# Patient Record
Sex: MALE | Race: WHITE | Employment: OTHER | URBAN - METROPOLITAN AREA
[De-identification: names, ages, dates, MRNs, and addresses within clinical notes are randomized per-mention and may not be internally consistent; named-entity substitution may affect disease eponyms.]

---

## 2017-04-18 ENCOUNTER — OFFICE VISIT (OUTPATIENT)
Dept: ORTHOPEDICS | Facility: CLINIC | Age: 47
End: 2017-04-18
Payer: COMMERCIAL

## 2017-04-18 VITALS
SYSTOLIC BLOOD PRESSURE: 130 MMHG | HEART RATE: 70 BPM | HEIGHT: 74 IN | WEIGHT: 272 LBS | BODY MASS INDEX: 34.91 KG/M2 | DIASTOLIC BLOOD PRESSURE: 87 MMHG

## 2017-04-18 DIAGNOSIS — M54.16 LUMBAR RADICULITIS: Primary | ICD-10-CM

## 2017-04-18 DIAGNOSIS — M54.50 CHRONIC LEFT-SIDED LOW BACK PAIN WITHOUT SCIATICA: ICD-10-CM

## 2017-04-18 DIAGNOSIS — G89.29 CHRONIC LEFT-SIDED LOW BACK PAIN WITHOUT SCIATICA: ICD-10-CM

## 2017-04-18 PROCEDURE — 99204 OFFICE O/P NEW MOD 45 MIN: CPT | Performed by: PHYSICIAN ASSISTANT

## 2017-04-18 NOTE — MR AVS SNAPSHOT
After Visit Summary   4/18/2017    Sergio Cadena    MRN: [id number]           Patient Information     Date Of Birth          1970        Visit Information        Provider Department      4/18/2017 3:00 PM Lucia Cisneros PA-C [facility]        Today's Diagnoses     Lumbar radiculitis    -  1    Chronic left-sided low back pain without sciatica          Care Instructions    To schedule your MRI and Injection with [facility], please call [phone].  Please allow 2-3 business days for clinic to obtain results.  We will contact you, review your results with you and update your treatment plan at that time.      If this fails to improve symptoms, I am recommending an evaluation with physical therapy for manual therapy treatments.          Follow-ups after your visit        Future tests that were ordered for you today     Open Future Orders        Priority Expected Expires Ordered    XR Lumbar Epidural Injection Incl Imaging Routine 4/18/2017 4/18/2018 4/18/2017    MR Lumbar Spine w/o Contrast Routine  4/18/2018 4/18/2017            Who to contact     If you have questions or need follow up information about today's clinic visit or your schedule please contact [facility] directly at [phone].  Normal or non-critical lab and imaging results will be communicated to you by MyChart, letter or phone within 4 business days after the clinic has received the results. If you do not hear from us within 7 days, please contact the clinic through [employer] or phone. If you have a critical or abnormal lab result, we will notify you by phone as soon as possible.  Submit refill requests through [facility] or call your pharmacy and they will forward the refill request to us. Please allow 3 business days for your refill to be completed.          Additional Information About Your Visit        MyChart Information     [facility] lets  "you send messages to your doctor, view your test results, renew your prescriptions, schedule appointments and more. To sign up, go to [email or web address] . Click on \"Log in\" on the left side of the screen, which will take you to the Welcome page. Then click on \"Sign up Now\" on the right side of the page.     You will be asked to enter the access code listed below, as well as some personal information. Please follow the directions to create your username and password.     Your access code is: [email or web address]  Expires: 2017  3:32 PM     Your access code will  in 90 days. If you need help or a new code, please call your [facility] clinic or [phone].        Care EveryWhere ID     This is your Care EveryWhere ID. This could be used by other organizations to access your [facility] medical records  [id number]        Your Vitals Were     Pulse Height BMI (Body Mass Index)             70 6' 1.5\" (1.867 m) 35.4 kg/m2          Blood Pressure from Last 3 Encounters:   17 130/87   16 (!) 154/96   09/01/15 128/90    Weight from Last 3 Encounters:   17 272 lb (123.4 kg)   16 272 lb 1.6 oz (123.4 kg)   09/01/15 260 lb 6.4 oz (118.1 kg)               Primary Care Provider Office Phone # Fax #    Leda Shanks [phone]       [facility] [address] [facility] [address]        Thank you!     Thank you for choosing [facility]  for your care. Our goal is always to provide you with excellent care. Hearing back from our patients is one way we can continue to improve our services. Please take a few minutes to complete the written survey that you may receive in the mail after your visit with us. Thank you!             Your Updated Medication List - Protect others around you: Learn how to safely use, store and throw away your medicines at www.disposemymeds.org.          This list is accurate as of: 17  3:32 PM.  Always use " your most recent med list.                   Brand Name Dispense Instructions for use    albuterol 108 (90 BASE) MCG/ACT Inhaler    PROAIR HFA/PROVENTIL HFA/VENTOLIN HFA    1 Inhaler    Inhale 2 puffs into the lungs every 6 hours as needed for shortness of breath / dyspnea or wheezing       guaiFENesin-codeine 100-10 MG/5ML Soln solution    ROBITUSSIN AC    120 mL    Take 10 mLs by mouth every 4 hours as needed for cough       hydrochlorothiazide 12.5 MG Tabs tablet     90 tablet    Take 1 tablet (12.5 mg) by mouth daily       lisinopril 40 MG tablet    PRINIVIL/ZESTRIL    90 tablet    Take 1 tablet (40 mg) by mouth daily       priLOSEC OTC 20 MG tablet   Generic drug:  omeprazole      1 TABLET DAILY

## 2017-04-18 NOTE — NURSING NOTE
"Initial /87  Pulse 70  Ht 6' 1.5\" (1.867 m)  Wt 272 lb (123.4 kg)  BMI 35.4 kg/m2 Estimated body mass index is 35.4 kg/(m^2) as calculated from the following:    Height as of this encounter: 6' 1.5\" (1.867 m).    Weight as of this encounter: 272 lb (123.4 kg). .      "

## 2017-04-18 NOTE — PATIENT INSTRUCTIONS
To schedule your MRI and Injection with [facility], please call [phone].  Please allow 2-3 business days for clinic to obtain results.  We will contact you, review your results with you and update your treatment plan at that time.      If this fails to improve symptoms, I am recommending an evaluation with physical therapy for manual therapy treatments.

## 2017-04-18 NOTE — LETTER
[facility]  [address]  [address]  [place] [address]  Phone: [phone]  Fax: [phone]    April 18, 2017        Sergio Cadena  [address] [address] [address]          To whom it may concern:    RE: Sergio Cadena    Patient was seen and treated today at our clinic.  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    Please contact me for questions or concerns.      Sincerely,        Lucia Cisneros PA-C

## 2017-04-18 NOTE — PROGRESS NOTES
"Sports Medicine Clinic Visit    PCP: Leda Shanks    Sergio Cadena is a 47 year old male who is seen  as a self referral presenting with hip pain    Injury: None    Location of Pain: left lateral hip pain, buttock pain and low back pain.  Duration of Pain: 4 years  Rating of Pain at worst: 4/10  Rating of Pain Currently: 3/10  Symptoms are better with: IBU  Symptoms are worse with: Any movement, getting out of bed, walking for long periods, extending back  Additional Features:   Positive: None  Other evaluation and/or treatments so far consists of: massage once a week (some help) taking wallet out of that pockent  Prior History of related problems: None    Social History: Works in law enforcement.    Review of Systems  Musculoskeletal: as above  Remainder of review of systems is negative including constitutional, CV, pulmonary, GI, Skin and Neurologic except as noted in HPI or medical history.    Family history, medical history and surgical history have all been discussed with patient and appended to medical chart below.    Past Medical History:   Diagnosis Date     Diaphragmatic hernia without mention of obstruction or gangrene     EDG 9/05     Esophageal reflux     EGD 9/05     Stricture and stenosis of esophagus     Dilated 9/05     Past Surgical History:   Procedure Laterality Date     Fracture left elbow repaired by casting  1980     Gastroscopy  &  Esophageal dilatation  7/9/2003     History reviewed. No pertinent family history.  Objective  /87  Pulse 70  Ht 6' 1.5\" (1.867 m)  Wt 272 lb (123.4 kg)  BMI 35.4 kg/m2  Constitutional:well-developed, well-nourished, and in no distress.   Cardiovascular: Intact distal pulses.    Neurological: alert. Gait normal  Skin: Skin is warm and dry.   Psychiatric: Mood and affect normal.   Respiratory: unlabored, speaks in full sentences  Hematologic/Lymphatic/Immunologic: neg lymphadenopathy, neg lymphedema    Exam:  Back Exam:    Inspection:       " no visible deformity in the low back       normal skin       normal vascular       normal lymphatic    ROM:       full flexion       no asymmetric rotation of the pelvis with flexion       limited flexion due to pain    Full ROM of hip without discomfort    Tender/ Tissue Texture Abnormality:       paraspinal muscles - lumbar       TFL       SI joint           Non Tender:       remainder of lumbar spine    Strength:       hip flexion 5/5       knee extension 5/5       ankle dorsiflexion 5/5       ankle plantarflexion 5/5       dorsiflexion of the great toe 5/5      Hip abduction: 4/5      Hip adduction:  5/5    Reflexes:       patellar (L3, L4) symmetric normal       achilles tendons (S1) symmetric normal    Sensation:      grossly intact throughout lower extremities    Special tests:       straight leg raise left neg        straight leg raise right neg                 Assessment:  1. Lumbar radiculitis    2. Chronic left-sided low back pain without sciatica        Plan:  Discussed the assessment with the patient.  Topical Treatments: Ice, Heat or Topical Analgesics  Over the counter medication: Acetaminophen (Tylenol) 1000mg every 6 hours with food (Maximum of 3000mg/day)  Naproxen (Aleve) maximum of 440mg two times a day with food  MRI of the lumbar spine  Observe and monitor symptoms  Activity Modification: discussed appropriate body mechanics and posture  Rehab: discussed the benefits of physical therapy however patient would like to wait until he has had his MRI, he may consider chiropractic as well  Epidural Steroid injection done at [facility]  Follow up: 6 weeks, sooner if needed          Lucia Cisneros PA-C  [facility]  [facility]      Disclaimer: This note consists of symbols derived from keyboarding, dictation and/or voice recognition software. As a result, there may be errors in the script that have gone undetected. Please consider this when  interpreting information found in this chart.

## 2017-04-19 ENCOUNTER — TELEPHONE (OUTPATIENT)
Dept: ORTHOPEDICS | Facility: CLINIC | Age: 47
End: 2017-04-19

## 2017-04-19 NOTE — TELEPHONE ENCOUNTER
Patient called back.  He just needs letter amended to say he has no restrictions on daily work, just should refrain from Use of Force training.    Letter completed and faxed to [phone]    SIDDHARTH Vu,  ATC

## 2017-04-19 NOTE — TELEPHONE ENCOUNTER
Reason for Call:  Other letter for work     Detailed comments: pt calling stating he is needing a new letter for work. He needs it to state that he can continue to work w/ out restrictions. Would like someone to call him to discuss how this can be written     Phone Number Patient can be reached at: Home number on file [phone] (home)    Best Time: any     Can we leave a detailed message on this number? YES    Call taken on 4/19/2017 at 10:59 AM by Jia Kelly

## 2017-04-19 NOTE — LETTER
[facility]  [address]  [address]  [place] [address]  Phone: [phone]  Fax: [phone]    April 18, 2017        Sergio Cadena  [address] [address] [address]          To whom it may concern:    RE: Sergio Cadena    Patient was seen and treated today at our clinic.  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  However, patient may work in his normal job duties without restrictions at this time.     Please contact me for questions or concerns.      Sincerely,        Lucia Cisneros PA-C

## 2017-04-19 NOTE — TELEPHONE ENCOUNTER
Patient is calling in to check on the status    Please advise if unable to address and if so when this would be addressed    He can [phone]

## 2017-04-21 DIAGNOSIS — I10 HYPERTENSION GOAL BP (BLOOD PRESSURE) < 140/90: ICD-10-CM

## 2017-04-21 RX ORDER — HYDROCHLOROTHIAZIDE 12.5 MG/1
TABLET ORAL
Qty: 60 TABLET | Refills: 0 | Status: SHIPPED | OUTPATIENT
Start: 2017-04-21 | End: 2017-08-24 | Stop reason: DRUGHIGH

## 2017-04-21 RX ORDER — LISINOPRIL 40 MG/1
TABLET ORAL
Qty: 60 TABLET | Refills: 0 | Status: SHIPPED | OUTPATIENT
Start: 2017-04-21 | End: 2017-07-11

## 2017-04-21 NOTE — TELEPHONE ENCOUNTER
Routing refill request to provider for review/approval because:  Labs not current:  K+ and CR  Patient needs to be seen because it has been more than 1 year since last office visit.      Thank you  Daphne LARSEN RN

## 2017-04-21 NOTE — TELEPHONE ENCOUNTER
Last seen one year ago.  I will fill two months of meds. He should come fasting for his med refill appointment   Please let him know   cgb

## 2017-04-21 NOTE — TELEPHONE ENCOUNTER
Called pt and notified him that he needs to come fasting to an appt for med refill.    Lilia Cortés

## 2017-04-21 NOTE — TELEPHONE ENCOUNTER
hydrochlorothiazide 12.5 MG TABS      Last Written Prescription Date: 4/12/16  Last Fill Quantity: 90, # refills: 3  Last Office Visit with [facility] prescribing provider: 4/12/16       Potassium   Date Value Ref Range Status   03/17/2016 3.5 3.4 - 5.3 mmol/L Final     Creatinine   Date Value Ref Range Status   03/17/2016 0.86 0.66 - 1.25 mg/dL Final     BP Readings from Last 3 Encounters:   04/18/17 130/87   04/12/16 (!) 154/96   09/01/15 128/90         lisinopril (PRINIVIL,ZESTRIL) 40 MG tablet      Last Written Prescription Date: 4/12/16  Last Fill Quantity: 90, # refills: 3  Last Office Visit with [facility], [facility] or [facility] prescribing provider: 4/12/16       Potassium   Date Value Ref Range Status   03/17/2016 3.5 3.4 - 5.3 mmol/L Final     Creatinine   Date Value Ref Range Status   03/17/2016 0.86 0.66 - 1.25 mg/dL Final     BP Readings from Last 3 Encounters:   04/18/17 130/87   04/12/16 (!) 154/96   09/01/15 128/90

## 2017-05-02 ENCOUNTER — TELEPHONE (OUTPATIENT)
Dept: ORTHOPEDICS | Facility: CLINIC | Age: 47
End: 2017-05-02

## 2017-05-02 ENCOUNTER — RADIANT APPOINTMENT (OUTPATIENT)
Dept: GENERAL RADIOLOGY | Facility: CLINIC | Age: 47
End: 2017-05-02
Attending: PHYSICIAN ASSISTANT
Payer: COMMERCIAL

## 2017-05-02 ENCOUNTER — RADIANT APPOINTMENT (OUTPATIENT)
Dept: MRI IMAGING | Facility: CLINIC | Age: 47
End: 2017-05-02
Attending: PHYSICIAN ASSISTANT
Payer: COMMERCIAL

## 2017-05-02 VITALS — DIASTOLIC BLOOD PRESSURE: 92 MMHG | HEART RATE: 74 BPM | SYSTOLIC BLOOD PRESSURE: 165 MMHG | OXYGEN SATURATION: 95 %

## 2017-05-02 DIAGNOSIS — M54.50 CHRONIC LEFT-SIDED LOW BACK PAIN WITHOUT SCIATICA: ICD-10-CM

## 2017-05-02 DIAGNOSIS — G89.29 CHRONIC LEFT-SIDED LOW BACK PAIN WITHOUT SCIATICA: ICD-10-CM

## 2017-05-02 DIAGNOSIS — M54.16 LUMBAR RADICULITIS: ICD-10-CM

## 2017-05-02 LAB — RADIOLOGIST FLAGS: NORMAL

## 2017-05-02 PROCEDURE — 62323 NJX INTERLAMINAR LMBR/SAC: CPT | Performed by: RADIOLOGY

## 2017-05-02 PROCEDURE — 72148 MRI LUMBAR SPINE W/O DYE: CPT | Performed by: RADIOLOGY

## 2017-05-02 RX ORDER — LIDOCAINE HYDROCHLORIDE 10 MG/ML
5 INJECTION, SOLUTION EPIDURAL; INFILTRATION; INTRACAUDAL; PERINEURAL ONCE
Status: COMPLETED | OUTPATIENT
Start: 2017-05-02 | End: 2017-05-02

## 2017-05-02 RX ORDER — BUPIVACAINE HYDROCHLORIDE 5 MG/ML
10 INJECTION, SOLUTION EPIDURAL; INTRACAUDAL ONCE
Status: COMPLETED | OUTPATIENT
Start: 2017-05-02 | End: 2017-05-02

## 2017-05-02 RX ORDER — METHYLPREDNISOLONE ACETATE 80 MG/ML
80 INJECTION, SUSPENSION INTRA-ARTICULAR; INTRALESIONAL; INTRAMUSCULAR; SOFT TISSUE ONCE
Status: COMPLETED | OUTPATIENT
Start: 2017-05-02 | End: 2017-05-02

## 2017-05-02 RX ORDER — IOPAMIDOL 408 MG/ML
10 INJECTION, SOLUTION INTRATHECAL ONCE
Status: COMPLETED | OUTPATIENT
Start: 2017-05-02 | End: 2017-05-02

## 2017-05-02 RX ADMIN — IOPAMIDOL 2 ML: 408 INJECTION, SOLUTION INTRATHECAL at 07:55

## 2017-05-02 RX ADMIN — LIDOCAINE HYDROCHLORIDE 50 MG: 10 INJECTION, SOLUTION EPIDURAL; INFILTRATION; INTRACAUDAL; PERINEURAL at 07:54

## 2017-05-02 RX ADMIN — BUPIVACAINE HYDROCHLORIDE 2 ML: 5 INJECTION, SOLUTION EPIDURAL; INTRACAUDAL at 07:55

## 2017-05-02 RX ADMIN — METHYLPREDNISOLONE ACETATE 80 MG: 80 INJECTION, SUSPENSION INTRA-ARTICULAR; INTRALESIONAL; INTRAMUSCULAR; SOFT TISSUE at 07:54

## 2017-05-02 NOTE — TELEPHONE ENCOUNTER
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 If he can;t return without any restrictions he would have to continue on light duty.    Please clarify. If restriction stated will not be omitted please call to inform that provider recommends refraining from use of force training [phone]

## 2017-05-02 NOTE — LETTER
[facility]  [address]  [address]  [place] [address]  Phone: [phone]  Fax: [phone]    05/02/17        Sergio Cadena  [address] [address] [address]          To whom it may concern:    RE: Sergio Cadena    Patient was seen for an injection and MRI today.  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  However, patient may work in his normal job duties without restrictions at this time.     Please contact me for questions or concerns.      Sincerely,          Lucia Cisneros PA-C

## 2017-05-02 NOTE — PROGRESS NOTES
: Sergio was seen in X-ray today for a lumbar epidural injection. Patient rated pain before procedure 1-2/10. After procedure patient rated pain 0/10. This pain level is (is/is not) acceptable to patient. Patient discharged home with wife.

## 2017-05-02 NOTE — TELEPHONE ENCOUNTER
Reason for Call:  Patient is calling in states that he had MRI and injection in [place] and is requesting a return to work note without restrictions asap        Detailed comments: please fax to: Clarisse Hoffmann at [phone]    Phone Number Patient can be reached at: Home number on file [phone] (home)    Best Time: any    Can we leave a detailed message on this number? YES    Call taken on 5/2/2017 at 9:38 AM by Christi Max

## 2017-05-02 NOTE — TELEPHONE ENCOUNTER
Letter provided and faxed to Clarisse as requested.  Lucia please advise on MRI results when available.    Noman Edwards ATC

## 2017-05-02 NOTE — LETTER
[facility]  [address]  [address]  [place] [address]  Phone: [phone]  Fax: [phone]    05/04/17    Sergio ARMSTRONG Tammi  [address] [address] [address]      To whom it may concern:     Sergio may return to work 5/4/17 without restrictions.  He is to follow up in clinic as needed.  Please contact the clinic with any other questions or concerns.      Sincerely,      Lucia Cisneros PA-C

## 2017-05-03 NOTE — TELEPHONE ENCOUNTER
Pt calling to check status of new letter. Please see below.      Please call    Jia Kelly  Specialty CSS

## 2017-05-03 NOTE — TELEPHONE ENCOUNTER
Patient is calling inquiring the status of this request    Any questions please call [phone]    Clarisse BAUMAN is out of office today so please call patient when this has been faxed

## 2017-05-04 ENCOUNTER — TELEPHONE (OUTPATIENT)
Dept: ORTHOPEDICS | Facility: CLINIC | Age: 47
End: 2017-05-04

## 2017-05-04 DIAGNOSIS — M51.9 LUMBAR DISC LESION: Primary | ICD-10-CM

## 2017-05-04 NOTE — TELEPHONE ENCOUNTER
Spoke with Pt.  New letter lifting restrictions faxed to Clarisse per Pt request.    Brinda MENESES,  CMA

## 2017-05-04 NOTE — TELEPHONE ENCOUNTER
Please inform patient that the letter was sent to HR to Clarisse Hoffmann however she has reported that she will not proceed with the letter as he does have the restriction of not being able to participate in Use of Force Training.  She states that he will have to go back to light duty restrictions.  If he is willing to participate in Use of Force Training, then they will allow him to return without restrictions.    Lucia Cisneros PA-C

## 2017-05-04 NOTE — TELEPHONE ENCOUNTER
I left a voicemail with the patient to have him contact clinic to review results.    Lucia Cisneros PA-C

## 2017-05-06 DIAGNOSIS — M51.9 LUMBAR DISC LESION: Primary | ICD-10-CM

## 2017-05-15 ENCOUNTER — RADIANT APPOINTMENT (OUTPATIENT)
Dept: MRI IMAGING | Facility: CLINIC | Age: 47
End: 2017-05-15
Attending: PHYSICIAN ASSISTANT
Payer: COMMERCIAL

## 2017-05-15 DIAGNOSIS — M51.9 LUMBAR DISC LESION: ICD-10-CM

## 2017-05-15 PROCEDURE — 72149 MRI LUMBAR SPINE W/DYE: CPT | Performed by: RADIOLOGY

## 2017-05-15 PROCEDURE — A9585 GADOBUTROL INJECTION: HCPCS | Performed by: RADIOLOGY

## 2017-05-15 RX ORDER — GADOBUTROL 604.72 MG/ML
10 INJECTION INTRAVENOUS ONCE
Status: COMPLETED | OUTPATIENT
Start: 2017-05-15 | End: 2017-05-15

## 2017-05-15 RX ADMIN — GADOBUTROL 10 ML: 604.72 INJECTION INTRAVENOUS at 16:45

## 2017-05-16 ENCOUNTER — TELEPHONE (OUTPATIENT)
Dept: ORTHOPEDICS | Facility: CLINIC | Age: 47
End: 2017-05-16

## 2017-05-16 DIAGNOSIS — D32.1 SPINAL MENINGIOMA (H): Primary | ICD-10-CM

## 2017-05-16 PROBLEM — D49.2 NERVE SHEATH TUMOR: Status: ACTIVE | Noted: 2017-05-16

## 2017-05-16 NOTE — TELEPHONE ENCOUNTER
Called patient and gave him his results.  I am recommending he have an orthopedic oncology consultation for further discussion regarding the findings and for future follow up.      Lucia Cisneros PA-C

## 2017-05-17 ENCOUNTER — TELEPHONE (OUTPATIENT)
Dept: ORTHOPEDICS | Facility: CLINIC | Age: 47
End: 2017-05-17

## 2017-05-17 NOTE — TELEPHONE ENCOUNTER
"Pt calls ortho triage line seeking appt with Orthopedics surgeon, Oncology vs Spine Surgeon, d/t possible meningioma on spine. Per MRI on 05/15 impression states: \"5mm intradural/extra medullary enhancing mass at L2-L3, likely nerve sheath tumor or meningioma\".  Writer asked Dr Romano to review images and per conversation with Dr Romano the appropriate team for this pt to see is Neurosurgery. Pt was given telephone number to neurosurgery and will call to set up an appointment.  "

## 2017-06-29 ENCOUNTER — OFFICE VISIT (OUTPATIENT)
Dept: NEUROSURGERY | Facility: CLINIC | Age: 47
End: 2017-06-29

## 2017-06-29 VITALS
DIASTOLIC BLOOD PRESSURE: 98 MMHG | WEIGHT: 259 LBS | HEART RATE: 83 BPM | HEIGHT: 73 IN | SYSTOLIC BLOOD PRESSURE: 167 MMHG | BODY MASS INDEX: 34.33 KG/M2 | RESPIRATION RATE: 20 BRPM | TEMPERATURE: 97.6 F | OXYGEN SATURATION: 96 %

## 2017-06-29 DIAGNOSIS — D32.1 SPINAL MENINGIOMA (H): Primary | ICD-10-CM

## 2017-06-29 RX ORDER — CHLORAL HYDRATE 500 MG
2 CAPSULE ORAL DAILY
COMMUNITY
End: 2017-12-11

## 2017-06-29 ASSESSMENT — ENCOUNTER SYMPTOMS
COUGH: 1
STIFFNESS: 1

## 2017-06-29 ASSESSMENT — PAIN SCALES - GENERAL: PAINLEVEL: MILD PAIN (2)

## 2017-06-29 NOTE — MR AVS SNAPSHOT
After Visit Summary   6/29/2017    Sergio Cadena    MRN: [id number]           Patient Information     Date Of Birth          1970        Visit Information        Provider Department      6/29/2017 9:30 AM Tash Cartagena MD [facility] Neurosurgery        Today's Diagnoses     Spinal meningioma (H)    -  1       Follow-ups after your visit        Follow-up notes from your care team     Return for Follow-up after testing.      Your next 10 appointments already scheduled     Jul 11, 2017  9:00 AM CDT   [facility]    [address] [address]   [phone]            Aug 01, 2017 10:15 AM CDT   (Arrive by 10:00 AM)   EMG with Enrique Clinton MD   [facility] EMG ([facility])    [address] [place] [address] [phone]           Do not use lotions or creams on the area to be tested. If you are on blood thinners (Warfarin, Coumadin, Lovenox, etc), please contact your primary care physician to check if it is safe to stop them 3 days prior to testing. If you have anxiety, please check with your referring physician to obtain anti-anxiety medication for the procedure.            Aug 24, 2017  3:15 PM CDT   (Arrive by 3:00 PM)   Return Visit with Tash Cartagena MD   [facility] Neurosurgery ([facility])    [address] [place] [address] [phone]              Future tests that were ordered for you today     Open Future Orders        Priority Expected Expires Ordered    EMG (specify location and side) [NEU5] Routine 7/27/2017 6/29/2018 6/29/2017            Who to contact     Please call your clinic at [phone] to:    Ask questions about your health    Make or cancel appointments    Discuss your medicines    Learn about your test results    Speak to your doctor   If you have  "compliments or concerns about an experience at your clinic, or if you wish to file a complaint, please contact [facility] Patient Relations at [phone] or email us at [email or web address]         Additional Information About Your Visit        [employer] Information     [employer] is an electronic gateway that provides easy, online access to your medical records. With [employer], you can request a clinic appointment, read your test results, renew a prescription or communicate with your care team.     To sign up for [employer] visit the website at [email or web address]   You will be asked to enter the access code listed below, as well as some personal information. Please follow the directions to create your username and password.     Your access code is: [email or web address]  Expires: 2017  3:32 PM     Your access code will  in 90 days. If you need help or a new code, please contact your [facility] or call [phone] for assistance.        Care EveryWhere ID     This is your Care EveryWhere ID. This could be used by other organizations to access your [facility] medical records  [id number]        Your Vitals Were     Pulse Temperature Respirations Height Pulse Oximetry BMI (Body Mass Index)    83 97.6  F (36.4  C) (Oral) 20 1.842 m (6' 0.5\") 96% 34.64 kg/m2       Blood Pressure from Last 3 Encounters:   17 (!) 167/98   17 (!) 165/92   17 130/87    Weight from Last 3 Encounters:   17 117.5 kg (259 lb)   17 123.4 kg (272 lb)   16 123.4 kg (272 lb 1.6 oz)               Primary Care Provider Office Phone # Fax #    Leda Shanks [phone]       [facility] [address] [address] [address]        Equal Access to Services     HOWARD BROWNING AH: Josie Arceo, nury mercer, qadonnell mcaan ah. So [facility] " [phone].    ATENCIÓN: Si francesca flowers, tiene a vitale disposición servicios gratuitos de asistencia lingüística. Aniya al [phone].    We comply with applicable federal civil rights laws and [place] laws. We do not discriminate on the basis of race, color, national origin, age, disability sex, sexual orientation or gender identity.            Thank you!     Thank you for choosing [facility]  for your care. Our goal is always to provide you with excellent care. Hearing back from our patients is one way we can continue to improve our services. Please take a few minutes to complete the written survey that you may receive in the mail after your visit with us. Thank you!             Your Updated Medication List - Protect others around you: Learn how to safely use, store and throw away your medicines at www.disposemymeds.org.          This list is accurate as of: 6/29/17 11:17 AM.  Always use your most recent med list.                   Brand Name Dispense Instructions for use Diagnosis    fish oil-omega-3 fatty acids 1000 MG capsule      Take 2 g by mouth daily        hydrochlorothiazide 12.5 MG Tabs tablet     60 tablet    TAKE 1 TABLET BY MOUTH MARIAN Y    Hypertension goal BP (blood pressure) < 140/90       lisinopril 40 MG tablet    PRINIVIL/ZESTRIL    60 tablet    TAKE 1 TABLET BY MOUTH MARIAN Y    Hypertension goal BP (blood pressure) < 140/90       MULTIVITAMIN ADULTS PO      Take 1 tablet by mouth daily        priLOSEC OTC 20 MG tablet   Generic drug:  omeprazole      1 TABLET DAILY

## 2017-06-29 NOTE — LETTER
"6/29/2017       RE: Sergio Cadena  [address] [facility] [address]     Dear Colleague,    Thank you for referring your patient, Sergio Cadena, to the [facility] NEUROSURGERY at [facility]. Please see a copy of my visit note below.    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 MR was obtained for patient's back pain which has been ongoing for the past 3 years. He received a cortisone shot 05/2017 at the L2 level with slight improvement. His back pain begins in his low back and continues down to his LEFT lateral mid-thigh. He also takes ibuprofen which helps somewhat. Mr. Cadena reports his pain limits his ability to walk and work, as he is a . He does report a limp with walking and weakness with his left hip. He denies any bowel or bladder incontinence, numbness, impotence, or muscle weakness elsewhere. He denies any systemic issues with unintentional weight loss or fever or night sweats.     FamHx: father with hx of CABG    Physical exam:  BP (!) 167/98 (BP Location: Right arm, Patient Position: Sitting, Cuff Size: Adult Large)  Pulse 83  Temp 97.6  F (36.4  C) (Oral)  Resp 20  Ht 1.842 m (6' 0.5\")  Wt 117.5 kg (259 lb)  SpO2 96%  BMI 34.64 kg/m2    General: appears comfortable, in no acute distress   NEURO:  Mental: Alert and oriented x 3.   CN: CN II-XII grossly intact. Symmetric eyelid elevation and smile. No aphasia or dysarthria.   Motor:     Triceps Biceps Wrist Ext Wrist Flex    R  5 5 5 5 5   L  5 5 5 5 5    Hip Flex. Knee Ext. Knee Flex. Dorsiflex. Plantarflex.    R 4 5 5 5 5    L 4 5 5 5 5      Sensory: Intact to light touch throughout all dermatomes of bilateral lower extremities   Reflexes: Patellar and achilles reflex 2+ bilaterally     Imaging:   MR lumbar spine 5/15/17: 5 mm enhancing " mass at L2-L3 with mass below the level of the L2 nerve root     Assessment/Plan:   Mr. 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 The mass is likely benign and the differential includes myxopapillary ependymoma, spinal schwannoma or meningioma. The patient's signs and symptoms of pain from his buttock continuing to his lateral mid-thigh and hip flexion weakness cannot be clearly attributed to the lesion in his spine as the L2 nerve root leaves before the level of the mass but however, this does not exclude the possibility that the mass impacts L3. We discussed with the patient two potential strategies of watchful waiting and surgical resection including the risks and benefits of each strategy. He is currently amenable to further diagnostic workup and a watchful waiting strategy given the less likely but still possible correlation of his signs and symptoms with his spinal lesion.     Plan:   --nerve conduction study referral   --follow-up if worsening weakness, numbness    Pawan Brown MD   PGY-1, Neurosurgery      Again, thank you for allowing me to participate in the care of your patient.      Sincerely,    Tash Cartagena MD

## 2017-06-29 NOTE — NURSING NOTE
Chief Complaint   Patient presents with     Consult     [facility]- LOWER BACK PAIN/ ABNORMAL LUMBAR SPINE MRI     Tin Jessica, CMA

## 2017-06-29 NOTE — PROGRESS NOTES
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 MR was obtained for patient's back pain which has been ongoing for the past 3 years. He received a cortisone shot 05/2017 at the L2 level with slight improvement. His back pain begins in his low back and continues down to his LEFT lateral mid-thigh. He also takes ibuprofen which helps somewhat. Mr. Cadena reports his pain limits his ability to walk and work, as he is a . He does report a limp with walking and weakness with his left hip. He denies any bowel or bladder incontinence, numbness, impotence, or muscle weakness elsewhere. He denies any systemic issues with unintentional weight loss or fever or night sweats.     FamHx: father with hx of CABG    Physical exam:  BP (!) 167/98 (BP Location: Right arm, Patient Position: Sitting, Cuff Size: Adult Large)  Pulse 83  Temp 97.6  F (36.4  C) (Oral)  Resp 20  Ht 1.842 m (6' 0.5\")  Wt 117.5 kg (259 lb)  SpO2 96%  BMI 34.64 kg/m2    General: appears comfortable, in no acute distress   NEURO:  Mental: Alert and oriented x 3.   CN: CN II-XII grossly intact. Symmetric eyelid elevation and smile. No aphasia or dysarthria.   Motor:     Triceps Biceps Wrist Ext Wrist Flex    R  5 5 5 5 5   L  5 5 5 5 5    Hip Flex. Knee Ext. Knee Flex. Dorsiflex. Plantarflex.    R 4 5 5 5 5    L 4 5 5 5 5      Sensory: Intact to light touch throughout all dermatomes of bilateral lower extremities   Reflexes: Patellar and achilles reflex 2+ bilaterally     Imaging:   MR lumbar spine 5/15/17: 5 mm enhancing mass at L2-L3 with mass below the level of the L2 nerve root     Assessment/Plan:   Mr. 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 The mass is likely benign and the differential includes myxopapillary ependymoma, spinal schwannoma or meningioma. The patient's signs and " symptoms of pain from his buttock continuing to his lateral mid-thigh and hip flexion weakness cannot be clearly attributed to the lesion in his spine as the L2 nerve root leaves before the level of the mass but however, this does not exclude the possibility that the mass impacts L3. We discussed with the patient two potential strategies of watchful waiting and surgical resection including the risks and benefits of each strategy. He is currently amenable to further diagnostic workup and a watchful waiting strategy given the less likely but still possible correlation of his signs and symptoms with his spinal lesion.     Plan:   --nerve conduction study referral   --follow-up if worsening weakness, numbness    Pawan Brown MD   PGY-1, Neurosurgery

## 2017-07-11 ENCOUNTER — OFFICE VISIT (OUTPATIENT)
Dept: FAMILY MEDICINE | Facility: CLINIC | Age: 47
End: 2017-07-11
Payer: COMMERCIAL

## 2017-07-11 VITALS
HEART RATE: 60 BPM | WEIGHT: 257.4 LBS | SYSTOLIC BLOOD PRESSURE: 134 MMHG | BODY MASS INDEX: 34.11 KG/M2 | HEIGHT: 73 IN | DIASTOLIC BLOOD PRESSURE: 87 MMHG | TEMPERATURE: 97.5 F

## 2017-07-11 DIAGNOSIS — D49.2 NERVE SHEATH TUMOR: ICD-10-CM

## 2017-07-11 DIAGNOSIS — I10 ESSENTIAL HYPERTENSION: ICD-10-CM

## 2017-07-11 DIAGNOSIS — K21.9 GASTROESOPHAGEAL REFLUX DISEASE, ESOPHAGITIS PRESENCE NOT SPECIFIED: ICD-10-CM

## 2017-07-11 DIAGNOSIS — I10 HYPERTENSION GOAL BP (BLOOD PRESSURE) < 140/90: ICD-10-CM

## 2017-07-11 DIAGNOSIS — Z23 NEED FOR VACCINATION: ICD-10-CM

## 2017-07-11 DIAGNOSIS — E78.5 HYPERLIPIDEMIA LDL GOAL <130: ICD-10-CM

## 2017-07-11 DIAGNOSIS — Z00.01 ENCOUNTER FOR GENERAL ADULT MEDICAL EXAMINATION WITH ABNORMAL FINDINGS: Primary | ICD-10-CM

## 2017-07-11 DIAGNOSIS — R73.09 BLOOD GLUCOSE ABNORMAL: ICD-10-CM

## 2017-07-11 LAB
ANION GAP SERPL CALCULATED.3IONS-SCNC: 5 MMOL/L (ref 3–14)
BUN SERPL-MCNC: 11 MG/DL (ref 7–30)
CALCIUM SERPL-MCNC: 8.8 MG/DL (ref 8.5–10.1)
CHLORIDE SERPL-SCNC: 105 MMOL/L (ref 94–109)
CHOLEST SERPL-MCNC: 176 MG/DL
CO2 SERPL-SCNC: 28 MMOL/L (ref 20–32)
CREAT SERPL-MCNC: 0.86 MG/DL (ref 0.66–1.25)
GFR SERPL CREATININE-BSD FRML MDRD: NORMAL ML/MIN/1.7M2
GLUCOSE SERPL-MCNC: 99 MG/DL (ref 70–99)
HBA1C MFR BLD: 5.5 % (ref 4.3–6)
HDLC SERPL-MCNC: 34 MG/DL
LDLC SERPL CALC-MCNC: 118 MG/DL
NONHDLC SERPL-MCNC: 142 MG/DL
POTASSIUM SERPL-SCNC: 3.6 MMOL/L (ref 3.4–5.3)
SODIUM SERPL-SCNC: 138 MMOL/L (ref 133–144)
TRIGL SERPL-MCNC: 122 MG/DL

## 2017-07-11 PROCEDURE — 90471 IMMUNIZATION ADMIN: CPT | Performed by: FAMILY MEDICINE

## 2017-07-11 PROCEDURE — 90715 TDAP VACCINE 7 YRS/> IM: CPT | Performed by: FAMILY MEDICINE

## 2017-07-11 PROCEDURE — 36415 COLL VENOUS BLD VENIPUNCTURE: CPT | Performed by: FAMILY MEDICINE

## 2017-07-11 PROCEDURE — 99396 PREV VISIT EST AGE 40-64: CPT | Mod: 25 | Performed by: FAMILY MEDICINE

## 2017-07-11 PROCEDURE — 80048 BASIC METABOLIC PNL TOTAL CA: CPT | Performed by: FAMILY MEDICINE

## 2017-07-11 PROCEDURE — 83036 HEMOGLOBIN GLYCOSYLATED A1C: CPT | Performed by: FAMILY MEDICINE

## 2017-07-11 PROCEDURE — 80061 LIPID PANEL: CPT | Performed by: FAMILY MEDICINE

## 2017-07-11 RX ORDER — OMEPRAZOLE 20 MG/1
20 TABLET, DELAYED RELEASE ORAL DAILY
Qty: 90 TABLET | Refills: 3 | Status: SHIPPED | OUTPATIENT
Start: 2017-07-11 | End: 2017-08-24 | Stop reason: DRUGHIGH

## 2017-07-11 RX ORDER — LISINOPRIL 40 MG/1
40 TABLET ORAL DAILY
Qty: 90 TABLET | Refills: 3 | Status: SHIPPED | OUTPATIENT
Start: 2017-07-11 | End: 2018-07-01

## 2017-07-11 RX ORDER — HYDROCHLOROTHIAZIDE 12.5 MG/1
TABLET ORAL
Qty: 60 TABLET | Refills: 0 | Status: CANCELLED | OUTPATIENT
Start: 2017-07-11

## 2017-07-11 RX ORDER — HYDROCHLOROTHIAZIDE 25 MG/1
25 TABLET ORAL DAILY
Qty: 90 TABLET | Refills: 1 | Status: SHIPPED | OUTPATIENT
Start: 2017-07-11 | End: 2018-01-02

## 2017-07-11 NOTE — NURSING NOTE
"Chief Complaint   Patient presents with     Physical       Initial There were no vitals taken for this visit. Estimated body mass index is 34.64 kg/(m^2) as calculated from the following:    Height as of 6/29/17: 6' 0.5\" (1.842 m).    Weight as of 6/29/17: 259 lb (117.5 kg).  Medication Reconciliation: complete   Adriana Morris CMA      "

## 2017-07-11 NOTE — MR AVS SNAPSHOT
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   o   Discuss preventive care.    o   Review your medicines if your doctor has prescribed any.    You should be tested each year for STDs (sexually transmitted diseases) if you re at risk.     Have a cholesterol test every 5 years.     Have a colonoscopy (test for colon cancer) if someone in your family has had colon cancer or polyps before age 50.     After age 45, have a diabetes test (fasting glucose). If you are at risk for diabetes, you should have this test every 3 years.      Talk with your health care provider about whether or not a prostate cancer screening test (PSA) is right for you.    Shots: Get a flu shot each year. Get a tetanus shot every 10 years.     Nutrition:    Eat at least 5 servings of fruits and vegetables daily.     Eat whole-grain bread, whole-wheat pasta and brown rice instead of white grains and rice.     Talk to your provider about Calcium and Vitamin D.     Lifestyle    Exercise for at least 150 minutes a week (30 minutes a day, 5 days a week). This will help you control your weight and prevent disease.     Limit alcohol to one drink per day.     No smoking.     Wear sunscreen to prevent skin cancer.     See your dentist every six months for  an exam and cleaning.              Follow-ups after your visit        Your next 10 appointments already scheduled     Aug 01, 2017 10:15 AM CDT   (Arrive by 10:00 AM)   EMG with Enrique Clinton MD   [facility] EMG ([facility])    [address] [place] [id number] [phone]           Do not use lotions or creams on the area to be tested. If you are on blood thinners (Warfarin, Coumadin, Lovenox, etc), please contact your primary care physician to check if it is safe to stop them 3 days prior to testing. If you have anxiety, please check with your referring physician to obtain anti-anxiety medication for the procedure.            Aug 24, 2017  3:15 PM CDT   (Arrive by 3:00 PM)   Return Visit with Tash Cartagena MD   [facility] Neurosurgery ([facility])    [address] [place] [id number] [phone]              Future tests that were ordered for you today     Open Future Orders        Priority Expected Expires Ordered    Basic metabolic panel Routine  8/1/2017 7/11/2017            Who to contact     Normal or non-critical lab and imaging results will be communicated to you by [employer], letter or phone within 4 business days after the clinic has received the results. If you do not hear from us within 7 days, please contact the clinic through [employer] or phone. If you have a critical or abnormal lab result, we will notify you by phone as soon as possible.  Submit refill requests through 7-bites or call your pharmacy and they will forward the refill request to us. Please allow 3 business days for your refill to be completed.          If you need to speak with a  for additional information , please call: [phone]             Additional Information About Your Visit        7-bites Information     7-bites lets you send messages to your doctor, view your test results, renew your  "prescriptions, schedule appointments and more. To sign up, go to [email or web address] . Click on \"Log in\" on the left side of the screen, which will take you to the Welcome page. Then click on \"Sign up Now\" on the right side of the page.     You will be asked to enter the access code listed below, as well as some personal information. Please follow the directions to create your username and password.     Your access code is: [email or web address]  Expires: 2017  3:32 PM     Your access code will  in 90 days. If you need help or a new code, please call your [facility] clinic or [phone].        Care EveryWhere ID     This is your Care EveryWhere ID. This could be used by other organizations to access your [facility] medical records  [id number]        Your Vitals Were     Pulse Temperature Height BMI (Body Mass Index)          60 97.5  F (36.4  C) (Tympanic) 6' 1\" (1.854 m) 33.96 kg/m2         Blood Pressure from Last 3 Encounters:   17 134/87   17 (!) 167/98   17 (!) 165/92    Weight from Last 3 Encounters:   17 257 lb 6.4 oz (116.8 kg)   17 259 lb (117.5 kg)   17 272 lb (123.4 kg)              We Performed the Following     Basic metabolic panel     Hemoglobin A1c     Lipid panel reflex to direct LDL          Today's Medication Changes          These changes are accurate as of: 17  9:29 AM.  If you have any questions, ask your nurse or doctor.               These medicines have changed or have updated prescriptions.        Dose/Directions    * hydrochlorothiazide 12.5 MG Tabs tablet   This may have changed:  Another medication with the same name was added. Make sure you understand how and when to take each.   Used for:  Hypertension goal BP (blood pressure) < 140/90   Changed by:  Leda Shanks MD        TAKE 1 TABLET BY MOUTH MARIAN Y   Quantity:  60 tablet   Refills:  0       * hydrochlorothiazide 25 MG tablet   Commonly known as:  HYDRODIURIL   This may have " changed:  You were already taking a medication with the same name, and this prescription was added. Make sure you understand how and when to take each.   Used for:  Essential hypertension   Changed by:  Leda Shanks MD        Dose:  25 mg   Take 1 tablet (25 mg) by mouth daily   Quantity:  90 tablet   Refills:  1       lisinopril 40 MG tablet   Commonly known as:  PRINIVIL/ZESTRIL   This may have changed:  See the new instructions.   Used for:  Hypertension goal BP (blood pressure) < 140/90   Changed by:  Leda Shanks MD        Dose:  40 mg   Take 1 tablet (40 mg) by mouth daily   Quantity:  90 tablet   Refills:  3       omeprazole 20 MG tablet   Commonly known as:  priLOSEC OTC   This may have changed:  See the new instructions.   Used for:  Gastroesophageal reflux disease, esophagitis presence not specified   Changed by:  Leda Shanks MD        Dose:  20 mg   Take 1 tablet (20 mg) by mouth daily   Quantity:  90 tablet   Refills:  3       * Notice:  This list has 2 medication(s) that are the same as other medications prescribed for you. Read the directions carefully, and ask your doctor or other care provider to review them with you.         Where to get your medicines      These medications were sent to [id number] - [place] [address] [address]     Phone:  [phone]     hydrochlorothiazide 25 MG tablet    lisinopril 40 MG tablet    omeprazole 20 MG tablet                Primary Care Provider Office Phone # Fax #    Leda Shanks [phone]       [facility] [address] [place] [id number]        Equal Access to Services     [facility]: Hadii dequan villanueva hadbensono Solouise, waaxda luqadaha, qaybta kaalmada ademarilyn, donnell mckeon. So [facility] [phone].    ATENCIÓN: Si habla español, tiene a vitale disposición servicios gratuitos de asistencia  lingüísticaMaria Luisa Kwan al [phone].    We comply with applicable federal civil rights laws and [place] laws. We do not discriminate on the basis of race, color, national origin, age, disability sex, sexual orientation or gender identity.            Thank you!     Thank you for choosing [facility]  for your care. Our goal is always to provide you with excellent care. Hearing back from our patients is one way we can continue to improve our services. Please take a few minutes to complete the written survey that you may receive in the mail after your visit with us. Thank you!             Your Updated Medication List - Protect others around you: Learn how to safely use, store and throw away your medicines at www.disposemymeds.org.          This list is accurate as of: 7/11/17  9:29 AM.  Always use your most recent med list.                   Brand Name Dispense Instructions for use Diagnosis    fish oil-omega-3 fatty acids 1000 MG capsule      Take 2 g by mouth daily        * hydrochlorothiazide 12.5 MG Tabs tablet     60 tablet    TAKE 1 TABLET BY MOUTH MARIAN Y    Hypertension goal BP (blood pressure) < 140/90       * hydrochlorothiazide 25 MG tablet    HYDRODIURIL    90 tablet    Take 1 tablet (25 mg) by mouth daily    Essential hypertension       lisinopril 40 MG tablet    PRINIVIL/ZESTRIL    90 tablet    Take 1 tablet (40 mg) by mouth daily    Hypertension goal BP (blood pressure) < 140/90       MULTIVITAMIN ADULTS PO      Take 1 tablet by mouth daily        omeprazole 20 MG tablet    priLOSEC OTC    90 tablet    Take 1 tablet (20 mg) by mouth daily    Gastroesophageal reflux disease, esophagitis presence not specified       * Notice:  This list has 2 medication(s) that are the same as other medications prescribed for you. Read the directions carefully, and ask your doctor or other care provider to review them with you.

## 2017-07-11 NOTE — LETTER
[facility]  [address] [facility] [address]  Phone: [phone]    July 12, 2017    Sergio Cadena  [address] [facility] [address]              Dear Mr. Cadena,    metabolic panel ( electrolytes, blood sugar, kidney function) is normal.  Cholesterol panel looks mildly elevated - recommend exercise and weight loss. a1c is negative - no diabetes              Sincerely,      Dr. Leda Shanks

## 2017-07-11 NOTE — PROGRESS NOTES
SUBJECTIVE:   CC: Sergio Cadena is an 47 year old male who presents for preventative health visit.     Healthy Habits:    Do you get at least three servings of calcium containing foods daily (dairy, green leafy vegetables, etc.)? No    Amount of exercise or daily activities, outside of work: 1-2 day(s) per week    Problems taking medications regularly No    Medication side effects: No    Have you had an eye exam in the past two years? yes    Do you see a dentist twice per year? yes    Do you have sleep apnea, excessive snoring or daytime drowsiness?no    Hypertension :  40mg lisinopril  12.5 hctz     BP Readings from Last 6 Encounters:   07/11/17 134/87   06/29/17 (!) 167/98   05/02/17 (!) 165/92   04/18/17 130/87   04/12/16 (!) 154/96   09/01/15 128/90     Spinal cord tumor  Recently diagnosed.   Neurosurgery is planning to monitor it     Today's PHQ-2 Score:   PHQ-2 ( 1999 Pfizer) 11/5/2013 6/18/2012   Q1: Little interest or pleasure in doing things 0 0   Q2: Feeling down, depressed or hopeless 0 0   PHQ-2 Score 0 0       Abuse: Current or Past(Physical, Sexual or Emotional)- No  Do you feel safe in your environment - Yes    Social History   Substance Use Topics     Smoking status: Never Smoker     Smokeless tobacco: Never Used     Alcohol use 1.2 - 1.8 oz/week     0 Standard drinks or equivalent, 2 - 3 Cans of beer per week      Comment: WEEKENDS     The patient does not drink >3 drinks per day nor >7 drinks per week.    Last PSA: No results found for: PSA    Reviewed orders with patient. Reviewed health maintenance and updated orders accordingly - Yes      Reviewed and updated as needed this visit by clinical staff         Reviewed and updated as needed this visit by Provider            ROS:  C: NEGATIVE for fever, chills, change in weight  I: NEGATIVE for worrisome rashes, moles or lesions  E: NEGATIVE for vision changes or irritation  ENT: NEGATIVE for ear, mouth and throat problems  R: NEGATIVE for  significant cough or SOB  CV: NEGATIVE for chest pain, palpitations or peripheral edema  GI: NEGATIVE for nausea, abdominal pain, heartburn, or change in bowel habits   male: negative for dysuria, hematuria, decreased urinary stream, erectile dysfunction, urethral discharge  M: NEGATIVE for significant arthralgias or myalgia  N: NEGATIVE for weakness, dizziness or paresthesias  P: NEGATIVE for changes in mood or affect    OBJECTIVE:   There were no vitals taken for this visit.  EXAM:  GENERAL: healthy, alert and no distress  EYES: Eyes grossly normal to inspection, PERRL and conjunctivae and sclerae normal  HENT: ear canals and TM's normal, nose and mouth without ulcers or lesions  NECK: no adenopathy, no asymmetry, masses, or scars and thyroid normal to palpation  RESP: lungs clear to auscultation - no rales, rhonchi or wheezes  CV: regular rate and rhythm, normal S1 S2, no S3 or S4, no murmur, click or rub, no peripheral edema and peripheral pulses strong  ABDOMEN: soft, nontender, no hepatosplenomegaly, no masses and bowel sounds normal  MS: no gross musculoskeletal defects noted, no edema  NEURO: Normal strength and tone, mentation intact and speech normal  PSYCH: mentation appears normal, affect normal/bright    ASSESSMENT/PLAN:   (Z00.01) Encounter for general adult medical examination with abnormal findings  (primary encounter diagnosis)  Comment: We discussed self breast exams, exercise 30mins/day, and calcium with vitamin D at 1200mg/day, preferably from dietary sources.  Diet, Weight loss, and Exercise were discussed as well.   Plan:     (I10) Hypertension goal BP (blood pressure) < 140/90  Comment: will increase his HCTZ to 25m/day. Continue lisinopril. In 2 weeks, blood pressure check and bmp. The patient indicates understanding of these issues and agrees with the plan.   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  "Watch/wait for now   Plan:     (I10) Essential hypertension  Comment:   Plan: hydrochlorothiazide (HYDRODIURIL) 25 MG tablet,        Basic metabolic panel, Basic metabolic panel            (E78.5) Hyperlipidemia LDL goal <130  Comment: check labs today   Plan: Lipid panel reflex to direct LDL            (R73.09) Abnormal glucose  Comment: elevated blood sugar last year with normal a1c.   Plan: Hemoglobin A1c            (K21.9) Gastroesophageal reflux disease, esophagitis presence not specified  Comment: refilled   Plan: omeprazole (PRILOSEC OTC) 20 MG tablet            (Z23) Need for vaccination  Comment:   Plan: TDAP VACCINE (ADACEL) [82094.002], 1st          Administration  [72934]              COUNSELING:  Reviewed preventive health counseling, as reflected in patient instructions       Regular exercise       Healthy diet/nutrition         reports that he has never smoked. He has never used smokeless tobacco.    Estimated body mass index is 34.64 kg/(m^2) as calculated from the following:    Height as of 6/29/17: 6' 0.5\" (1.842 m).    Weight as of 6/29/17: 259 lb (117.5 kg).   Weight management plan: Discussed healthy diet and exercise guidelines and patient will follow up in 12 months in clinic to re-evaluate.    Counseling Resources:  ATP IV Guidelines  Pooled Cohorts Equation Calculator  FRAX Risk Assessment  ICSI Preventive Guidelines  Dietary Guidelines for Americans, 2010  USDA's MyPlate  ASA Prophylaxis  Lung CA Screening    Leda Shanks MD  [facility]  "

## 2017-08-01 ENCOUNTER — OFFICE VISIT (OUTPATIENT)
Dept: NEUROLOGY | Facility: CLINIC | Age: 47
End: 2017-08-01

## 2017-08-01 DIAGNOSIS — M25.552 HIP PAIN, LEFT: Primary | ICD-10-CM

## 2017-08-01 DIAGNOSIS — D32.1 SPINAL MENINGIOMA (H): ICD-10-CM

## 2017-08-01 NOTE — PROGRESS NOTES
[facility]  Electrodiagnostic Laboratory    Nerve Conduction & EMG Report          Patient:       Sergio Cadena  Patient ID:    [id number]  Gender:        Male  YOB: 1970  Age:           47 Years 5 Months      Referring Physician: Tash Cartagena MD    History & Examination:    47 year old man with pain at the left hip and reported weakness of left hip flexion. Direct examination at bedside today showed no weakness of left and right hip flexion, hip adduction or knee extension. He has an intradural extramedullary enhancing mass at L2-L3, possible meningioma. Query left L2 or L3 radiculopathies.    Techniques: Motor and sensory conduction studies were done with surface recording electrodes. Temperature was monitored and recorded throughout the study. Lower extremities were maintained at a temperature of 31degrees Centigrade or higher. EMG was done with a concentric needle electrode.     Results:    Left sural and superficial peroneal antidromic sensory NCSs were normal. Left peroneal and tibial motor NCSs were normal. EMG of left tibialis anterior, medial gastrocnemius, vastus lateralis, iliopsoas, adductor longus, and upper lumbar paraspinal muscles was normal.     Interpretation:    Normal study. No electrodiagnostic evidence of left L2 or L3 radiculopathy.     EMG Physician:    Enrique Clinton MD       Sensory NCS      Nerve / Sites Rec. Site Onset Peak NP Amp Ref. PP Amp Dist Mookie Ref. Temp     ms ms  V  V  V cm m/s m/s  C   L SURAL - Lat Mall      Calf Ankle 3.02 3.91 10.9 8.0 10.7 14 46.3 38.0 29.6   L SUP PERONEAL      Lat Leg Rodriguez 3.02 3.80 11.0  10.8 12.5 41.4 38.0 29.6       Motor NCS      Nerve / Sites Rec. Site Lat Ref. Amp Ref. Rel Amp Dist Mookie Ref. Dur. Area Temp.     ms ms mV mV % cm m/s m/s ms %  C   L DEEP PERONEAL - EDB      Ankle EDB 5.63 6.00 3.1 2.5 100 8   7.76 100 29.7      FibHead EDB 14.06  2.4  77.1 37 43.9 38.0 9.64 98.2 29.7      Pop Fos EDB 16.04  2.3   76.4 10 50.5 38.0 9.64 101 29.7   L TIBIAL - AH      Ankle AH 3.33 6.00 13.4 4.0 100 8   8.49 100 29.6      Pop Fos AH 13.80  6.4  47.6 45 43.0 38.0 9.22 52.1 29.6       EMG Summary Table     Spontaneous MUAP Recruitment    IA Fib PSW Fasc H.F. Amp Dur. PPP Pattern   L. TIB ANTERIOR 1+ None None None None N N N N   L. GASTROCN (MED) N None None None None N N N N   L. VAST LATERALIS N None None None None N N N N   L. ADD LONGUS N None None None None N N N N   L. ILIOPSOAS N None None None None N N N N   L. L4 PSP N None None None None N N N N

## 2017-08-01 NOTE — MR AVS SNAPSHOT
After Visit Summary   8/1/2017    Sergio Cadena    MRN: [id number]           Patient Information     Date Of Birth          1970        Visit Information        Provider Department      8/1/2017 10:15 AM Enrique Clinton MD [facility] EMG        Today's Diagnoses     Hip pain, left    -  1    Spinal meningioma (H)           Follow-ups after your visit        Your next 10 appointments already scheduled     Aug 24, 2017  3:15 PM CDT   (Arrive by 3:00 PM)   Return Visit with Tash Cartagena MD   [facility] Neurosurgery ([facility])    [address] [place] [id number] [phone]              Future tests that were ordered for you today     Open Future Orders        Priority Expected Expires Ordered    Needle EMG Each Extremity w/Paraspinal Area Complete (75306) Routine  8/1/2018 8/1/2017            Who to contact     Please call your clinic at [phone] to:    Ask questions about your health    Make or cancel appointments    Discuss your medicines    Learn about your test results    Speak to your doctor   If you have compliments or concerns about an experience at your clinic, or if you wish to file a complaint, please contact [facility] Patient Relations at [phone] or email us at [email or web address]         Additional Information About Your Visit        MyChart Information     Talking Layers is an electronic gateway that provides easy, online access to your medical records. With Talking Layers, you can request a clinic appointment, read your test results, renew a prescription or communicate with your care team.     To sign up for seedchanget visit the website at [email or web address]   You will be asked to enter the access code listed below, as well as some personal information. Please follow the directions to create your username and password.     Your access code is: [email or web address]  Expires:  10/16/2017  6:31 AM     Your access code will  in 90 days. If you need help or a new code, please contact your [facility] or call [phone] for assistance.        Care EveryWhere ID     This is your Care EveryWhere ID. This could be used by other organizations to access your [facility] medical records  [id number]         Blood Pressure from Last 3 Encounters:   17 134/87   17 (!) 167/98   17 (!) 165/92    Weight from Last 3 Encounters:   17 116.8 kg (257 lb 6.4 oz)   17 117.5 kg (259 lb)   17 123.4 kg (272 lb)              We Performed the Following     EMG (specify location and side) [NEU5]     NCS Motor with or without F-Wave, 3-4 nerves (16468)        Primary Care Provider Office Phone # Fax #    Leda Shanks [phone]       [facility] [address] [facility] [address]        Equal Access to Services     [facility] : Hadii aad ku hadasho Soomaali, waaxda luqadaha, qaybta kaalmada adeegyasamantha, donnell zamudio . So [facility] [phone].    ATENCIÓN: Si habla español, tiene a vitale disposición servicios gratuitos de asistencia lingüística. Aniya al [phone].    We comply with applicable federal civil rights laws and [place] laws. We do not discriminate on the basis of race, color, national origin, age, disability sex, sexual orientation or gender identity.            Thank you!     Thank you for choosing [facility]  for your care. Our goal is always to provide you with excellent care. Hearing back from our patients is one way we can continue to improve our services. Please take a few minutes to complete the written survey that you may receive in the mail after your visit with us. Thank you!             Your Updated Medication List - Protect others around you: Learn how to safely use, store and throw away your medicines at www.disposemymeds.org.          This list is accurate  as of: 8/1/17 10:41 AM.  Always use your most recent med list.                   Brand Name Dispense Instructions for use Diagnosis    fish oil-omega-3 fatty acids 1000 MG capsule      Take 2 g by mouth daily        * hydrochlorothiazide 12.5 MG Tabs tablet     60 tablet    TAKE 1 TABLET BY MOUTH MARIAN Y    Hypertension goal BP (blood pressure) < 140/90       * hydrochlorothiazide 25 MG tablet    HYDRODIURIL    90 tablet    Take 1 tablet (25 mg) by mouth daily    Essential hypertension       lisinopril 40 MG tablet    PRINIVIL/ZESTRIL    90 tablet    Take 1 tablet (40 mg) by mouth daily    Hypertension goal BP (blood pressure) < 140/90       MULTIVITAMIN ADULTS PO      Take 1 tablet by mouth daily        omeprazole 20 MG tablet    priLOSEC OTC    90 tablet    Take 1 tablet (20 mg) by mouth daily    Gastroesophageal reflux disease, esophagitis presence not specified       * Notice:  This list has 2 medication(s) that are the same as other medications prescribed for you. Read the directions carefully, and ask your doctor or other care provider to review them with you.

## 2017-08-01 NOTE — LETTER
8/1/2017       RE: Sergio Cadena  [address] [address] [facility] [address]     Dear Colleague,    Thank you for referring your patient, Sergio Cadena, to the [facility] EMG at [facility]. Please see a copy of my visit note below.        [facility]  Electrodiagnostic Laboratory    Nerve Conduction & EMG Report          Patient:       Sergio Cadena  Patient ID:    [id number]  Gender:        Male  YOB: 1970  Age:           47 Years 5 Months      Referring Physician: Tash Cartagena MD    History & Examination:    47 year old man with pain at the left hip and reported weakness of left hip flexion. Direct examination at bedside today showed no weakness of left and right hip flexion, hip adduction or knee extension. He has an intradural extramedullary enhancing mass at L2-L3, possible meningioma. Query left L2 or L3 radiculopathies.    Techniques: Motor and sensory conduction studies were done with surface recording electrodes. Temperature was monitored and recorded throughout the study. Lower extremities were maintained at a temperature of 31degrees Centigrade or higher. EMG was done with a concentric needle electrode.     Results:    Left sural and superficial peroneal antidromic sensory NCSs were normal. Left peroneal and tibial motor NCSs were normal. EMG of left tibialis anterior, medial gastrocnemius, vastus lateralis, iliopsoas, adductor longus, and upper lumbar paraspinal muscles was normal.     Interpretation:    Normal study. No electrodiagnostic evidence of left L2 or L3 radiculopathy.     EMG Physician:    Enrique Clinton MD       Sensory NCS      Nerve / Sites Rec. Site Onset Peak NP Amp Ref. PP Amp Dist Mookie Ref. Temp     ms ms  V  V  V cm m/s m/s  C   L SURAL - Lat Mall      Calf Ankle 3.02 3.91 10.9 8.0 10.7 14 46.3 38.0 29.6   L SUP PERONEAL      Lat Leg Rodriguez 3.02 3.80 11.0  10.8 12.5 41.4 38.0 29.6       Motor  NCS      Nerve / Sites Rec. Site Lat Ref. Amp Ref. Rel Amp Dist Mookie Ref. Dur. Area Temp.     ms ms mV mV % cm m/s m/s ms %  C   L DEEP PERONEAL - EDB      Ankle EDB 5.63 6.00 3.1 2.5 100 8   7.76 100 29.7      FibHead EDB 14.06  2.4  77.1 37 43.9 38.0 9.64 98.2 29.7      Pop Fos EDB 16.04  2.3  76.4 10 50.5 38.0 9.64 101 29.7   L TIBIAL - AH      Ankle AH 3.33 6.00 13.4 4.0 100 8   8.49 100 29.6      Pop Fos AH 13.80  6.4  47.6 45 43.0 38.0 9.22 52.1 29.6       EMG Summary Table     Spontaneous MUAP Recruitment    IA Fib PSW Fasc H.F. Amp Dur. PPP Pattern   L. TIB ANTERIOR 1+ None None None None N N N N   L. GASTROCN (MED) N None None None None N N N N   L. VAST LATERALIS N None None None None N N N N   L. ADD LONGUS N None None None None N N N N   L. ILIOPSOAS N None None None None N N N N   L. L4 PSP N None None None None N N N N                    Again, thank you for allowing me to participate in the care of your patient.      Sincerely,    Enrique Clinton MD

## 2017-08-24 ENCOUNTER — OFFICE VISIT (OUTPATIENT)
Dept: NEUROSURGERY | Facility: CLINIC | Age: 47
End: 2017-08-24

## 2017-08-24 VITALS
DIASTOLIC BLOOD PRESSURE: 92 MMHG | HEIGHT: 74 IN | WEIGHT: 257 LBS | RESPIRATION RATE: 20 BRPM | BODY MASS INDEX: 32.98 KG/M2 | OXYGEN SATURATION: 96 % | SYSTOLIC BLOOD PRESSURE: 140 MMHG | HEART RATE: 78 BPM | TEMPERATURE: 98.2 F

## 2017-08-24 DIAGNOSIS — D32.1: Primary | ICD-10-CM

## 2017-08-24 ASSESSMENT — PAIN SCALES - GENERAL: PAINLEVEL: MILD PAIN (3)

## 2017-08-24 NOTE — LETTER
8/24/2017       RE: Sergio Cadena  [address] [facility] [address]     Dear Colleague,    Thank you for referring your patient, Sergio Cadena, to the [facility]UROSURGERY [facility]. Please see a copy of my visit note below.    August 24, 2017            Leda Shanks MD   [facility]    [address] [place] [address]      RE:  Sergio Cadena      Dear Dr. Gregory Shanks:      Mr. Cadena was seen in followup in Neurosurgery Clinic today for further discussion regarding an MRI finding in his lumbar spine.  The last time I saw him was on 06/29 of this year, and at that time, the summary of that visit is as follows:  He had an MR for 3 years of ongoing back pain.  His back pain is in his left lateral mid thigh; however, this pain was limiting his ability to walk and to work as a .  Otherwise, he is healthy and active.      On exam today, his hip flexion on the left side was slightly reduced, likely secondary to pain.        His MRI dated 05/15/2017 demonstrated an approximately 5 mm enhancing mass at L2-3.  This was thought to most likely be either a meningioma, schwannoma or myxopapillary ependymoma.     After the last visit, I sent him for a nerve conduction study to try to identify whether his pain may actually be from this lesion; however, the EMG failed to detect any abnormality.  Therefore, I discussed with Mr. Cadena today that I remain unconvinced of any definite association between this finding on his MRI and his leg pain.  I also did not see any other cause of leg pain on his MRI.  I also advised him that I did not know what this lesion was, and that while it is possible to take it out, it was not necessary at this period of time as it may be a slow growing, benign lesion that may not require surgery.  At any rate, he does feel that he would like to have this removed, and he  remains hopeful that this will improve his leg symptoms.  I think he plans to have surgery around December of this year, and I think that would be fine.  I will put the orders in the computer.      It was my pleasure to see him in Neurosurgery Clinic today, and I look forward to seeing him again.      D: 2017 16:05   T: 2017 10:43   MT: СЕРГЕЙ      Name:     CARRIE CARTY   MRN:      [id number]        Account:      [id number]   :      1970           Service Date: 2017      Document: [id number]       Again, thank you for allowing me to participate in the care of your patient.      Sincerely,    Tash Cartagena MD

## 2017-08-24 NOTE — NURSING NOTE
Chief Complaint   Patient presents with     RECHECK     UMP- L2-3 INTRADURAL EXTRAMEDULARY MASS F/U     Tin Jessica, CMA

## 2017-08-24 NOTE — MR AVS SNAPSHOT
"              After Visit Summary   2017    Sergio Cadena    MRN: [id number]           Patient Information     Date Of Birth          1970        Visit Information        Provider Department      2017 3:15 PM Tash Cartagena MD [facility] Neurosurgery        Today's Diagnoses     Benign tumor of spinal meningioma (H)    -  1       Follow-ups after your visit        Who to contact     Please call your clinic at [phone] to:    Ask questions about your health    Make or cancel appointments    Discuss your medicines    Learn about your test results    Speak to your doctor   If you have compliments or concerns about an experience at your clinic, or if you wish to file a complaint, please contact [facility] Patient Relations at [phone] or email us at [email or web address]         Additional Information About Your Visit        MyChart Information     Jelastict is an electronic gateway that provides easy, online access to your medical records. With ONEHOPE, you can request a clinic appointment, read your test results, renew a prescription or communicate with your care team.     To sign up for Jelastict visit the website at [email or web address]   You will be asked to enter the access code listed below, as well as some personal information. Please follow the directions to create your username and password.     Your access code is: [email or web address]  Expires: 10/16/2017  6:31 AM     Your access code will  in 90 days. If you need help or a new code, please contact your [facility] or call [phone] for assistance.        Care EveryWhere ID     This is your Care EveryWhere ID. This could be used by other organizations to access your [facility] medical records  [id number]        Your Vitals Were     Pulse Temperature Respirations Height Pulse Oximetry BMI (Body Mass Index)    78 98.2  F (36.8  C) 20 1.867 m (6' 1.5\") 96% " 33.45 kg/m2       Blood Pressure from Last 3 Encounters:   08/24/17 (!) 140/92   07/11/17 134/87   06/29/17 (!) 167/98    Weight from Last 3 Encounters:   08/24/17 116.6 kg (257 lb)   07/11/17 116.8 kg (257 lb 6.4 oz)   06/29/17 117.5 kg (259 lb)              We Performed the Following     Yumi-Operative Worksheet          Today's Medication Changes          These changes are accurate as of: 8/24/17 11:59 PM.  If you have any questions, ask your nurse or doctor.               These medicines have changed or have updated prescriptions.        Dose/Directions    hydrochlorothiazide 25 MG tablet   Commonly known as:  HYDRODIURIL   This may have changed:  Another medication with the same name was removed. Continue taking this medication, and follow the directions you see here.   Used for:  Essential hypertension   Changed by:  Leda Shanks MD        Dose:  25 mg   Take 1 tablet (25 mg) by mouth daily   Quantity:  90 tablet   Refills:  1         Stop taking these medicines if you haven't already. Please contact your care team if you have questions.     omeprazole 20 MG tablet   Commonly known as:  priLOSEC OTC   Stopped by:  Tash Cartagena MD                    Primary Care Provider Office Phone # Fax #    Leda Shanks [phone] [address] [facility] [address]        Equal Access to Services     [facility] AH: Hadii aad ku hadasho Soomaali, waaxda luqadaha, qaybta kaalmada zabrina, donnell mckeon. So [facility] [phone].    ATENCIÓN: Si habla español, tiene a vitale disposición servicios gratuitos de asistencia lingüística. Aniya al [phone].    We comply with applicable federal civil rights laws and [place] laws. We do not discriminate on the basis of race, color, national origin, age, disability sex, sexual orientation or gender identity.            Thank you!     Thank you for choosing [facility]  for your care. Our goal is  always to provide you with excellent care. Hearing back from our patients is one way we can continue to improve our services. Please take a few minutes to complete the written survey that you may receive in the mail after your visit with us. Thank you!             Your Updated Medication List - Protect others around you: Learn how to safely use, store and throw away your medicines at www.disposemymeds.org.          This list is accurate as of: 8/24/17 11:59 PM.  Always use your most recent med list.                   Brand Name Dispense Instructions for use Diagnosis    fish oil-omega-3 fatty acids 1000 MG capsule      Take 2 g by mouth daily        hydrochlorothiazide 25 MG tablet    HYDRODIURIL    90 tablet    Take 1 tablet (25 mg) by mouth daily    Essential hypertension       lisinopril 40 MG tablet    PRINIVIL/ZESTRIL    90 tablet    Take 1 tablet (40 mg) by mouth daily    Hypertension goal BP (blood pressure) < 140/90       MULTIVITAMIN ADULTS PO      Take 1 tablet by mouth daily        omeprazole 20 MG CR capsule    priLOSEC     Take 20 mg by mouth daily

## 2017-08-25 NOTE — PROGRESS NOTES
August 24, 2017            Leda Shanks MD   [facility]    [address] [place] [address]      RE:  Sergio Simonaaquiles      Dear Dr. Gregory Shanks:      Mr. Cadena was seen in followup in Neurosurgery Clinic today for further discussion regarding an MRI finding in his lumbar spine.  The last time I saw him was on 06/29 of this year, and at that time, the summary of that visit is as follows:  He had an MR for 3 years of ongoing back pain.  His back pain is in his left lateral mid thigh; however, this pain was limiting his ability to walk and to work as a .  Otherwise, he is healthy and active.      On exam today, his hip flexion on the left side was slightly reduced, likely secondary to pain.        His MRI dated 05/15/2017 demonstrated an approximately 5 mm enhancing mass at L2-3.  This was thought to most likely be either a meningioma, schwannoma or myxopapillary ependymoma.        After the last visit, I sent him for a nerve conduction study to try to identify whether his pain may actually be from this lesion; however, the EMG failed to detect any abnormality.  Therefore, I discussed with Mr. Cadena today that I remain unconvinced of any definite association between this finding on his MRI and his leg pain.  I also did not see any other cause of leg pain on his MRI.  I also advised him that I did not know what this lesion was, and that while it is possible to take it out, it was not necessary at this period of time as it may be a slow growing, benign lesion that may not require surgery.  At any rate, he does feel that he would like to have this removed, and he remains hopeful that this will improve his leg symptoms.  I think he plans to have surgery around December of this year, and I think that would be fine.  I will put the orders in the computer.      It was my pleasure to see him in Neurosurgery Clinic today, and I look forward to seeing him again.          ELPIDIO CHUA MD             D: 2017 16:05   T: 2017 10:43   MT: СЕРГЕЙ      Name:     CARRIE CARTY   MRN:      [id number]        Account:      [id number]   :      1970           Service Date: 2017      Document: [id number]

## 2017-12-11 ENCOUNTER — ALLIED HEALTH/NURSE VISIT (OUTPATIENT)
Dept: SURGERY | Facility: CLINIC | Age: 47
End: 2017-12-11

## 2017-12-11 ENCOUNTER — OFFICE VISIT (OUTPATIENT)
Dept: SURGERY | Facility: CLINIC | Age: 47
End: 2017-12-11

## 2017-12-11 ENCOUNTER — ANESTHESIA EVENT (OUTPATIENT)
Dept: SURGERY | Facility: CLINIC | Age: 47
DRG: 520 | End: 2017-12-11
Payer: COMMERCIAL

## 2017-12-11 VITALS
WEIGHT: 260.3 LBS | OXYGEN SATURATION: 95 % | DIASTOLIC BLOOD PRESSURE: 84 MMHG | TEMPERATURE: 98.2 F | SYSTOLIC BLOOD PRESSURE: 161 MMHG | HEART RATE: 91 BPM | BODY MASS INDEX: 33.41 KG/M2 | HEIGHT: 74 IN | RESPIRATION RATE: 16 BRPM

## 2017-12-11 DIAGNOSIS — Z01.818 PREOP GENERAL PHYSICAL EXAM: Primary | ICD-10-CM

## 2017-12-11 DIAGNOSIS — Z01.818 PREOP GENERAL PHYSICAL EXAM: ICD-10-CM

## 2017-12-11 LAB
ALBUMIN UR-MCNC: NEGATIVE MG/DL
ANION GAP SERPL CALCULATED.3IONS-SCNC: 7 MMOL/L (ref 3–14)
APPEARANCE UR: CLEAR
BILIRUB UR QL STRIP: NEGATIVE
BUN SERPL-MCNC: 12 MG/DL (ref 7–30)
CALCIUM SERPL-MCNC: 8.8 MG/DL (ref 8.5–10.1)
CHLORIDE SERPL-SCNC: 105 MMOL/L (ref 94–109)
CO2 SERPL-SCNC: 28 MMOL/L (ref 20–32)
COLOR UR AUTO: ABNORMAL
CREAT SERPL-MCNC: 0.86 MG/DL (ref 0.66–1.25)
ERYTHROCYTE [DISTWIDTH] IN BLOOD BY AUTOMATED COUNT: 11.9 % (ref 10–15)
GFR SERPL CREATININE-BSD FRML MDRD: >90 ML/MIN/1.7M2
GLUCOSE SERPL-MCNC: 102 MG/DL (ref 70–99)
GLUCOSE UR STRIP-MCNC: NEGATIVE MG/DL
HCT VFR BLD AUTO: 46.8 % (ref 40–53)
HGB BLD-MCNC: 16.3 G/DL (ref 13.3–17.7)
HGB UR QL STRIP: NEGATIVE
INR PPP: 1.04 (ref 0.86–1.14)
KETONES UR STRIP-MCNC: NEGATIVE MG/DL
LEUKOCYTE ESTERASE UR QL STRIP: NEGATIVE
MCH RBC QN AUTO: 31.2 PG (ref 26.5–33)
MCHC RBC AUTO-ENTMCNC: 34.8 G/DL (ref 31.5–36.5)
MCV RBC AUTO: 90 FL (ref 78–100)
NITRATE UR QL: NEGATIVE
PH UR STRIP: 9 PH (ref 5–7)
PLATELET # BLD AUTO: 235 10E9/L (ref 150–450)
POTASSIUM SERPL-SCNC: 3.5 MMOL/L (ref 3.4–5.3)
RBC # BLD AUTO: 5.22 10E12/L (ref 4.4–5.9)
SODIUM SERPL-SCNC: 139 MMOL/L (ref 133–144)
SOURCE: ABNORMAL
SP GR UR STRIP: 1 (ref 1–1.03)
UROBILINOGEN UR STRIP-MCNC: 0 MG/DL (ref 0–2)
WBC # BLD AUTO: 8 10E9/L (ref 4–11)

## 2017-12-11 NOTE — PATIENT INSTRUCTIONS
Preparing for Your Surgery      Name:  Sergio Cadena   MRN:  [id number]   :  1970   Today's Date:  2017     Arriving for surgery:  Surgery date:  2017  Arrival time:  5:45 am  Please come to:       [facility] [address]  [address] [place]  [address]    -   parking is available in front of the hospital from 5:15 am to 8:00 pm    -  Stop at the Information Desk in the lobby    -   Inform the information person that you are here for surgery. An escort to 3c will be provided. If you would not like an escort, please proceed to 3C on the 3rd floor. [phone]     What can I eat or drink?  -  You may have solid food or milk products until 8 hours prior to your surgery.  -  You may have water, apple juice or 7up/Sprite until 2 hours prior to your surgery.    Which medicines can I take?  -  Do NOT take these medications in the morning, the day of surgery:  Lisinopril and hydrochlorthiazide and multi-vitamin       -  Please take these medications the day of surgery:  Omeprazole     How do I prepare myself?  -  Take two showers: one the night before surgery; and one the morning of surgery.         Use Scrubcare or Hibiclens to wash from neck down.  You may use your own shampoo and conditioner. No other hair products.   -  Do NOT use lotion, powder, deodorant, or antiperspirant the day of your surgery.  -  Do NOT wear any makeup, fingernail polish or jewelry.  -  Begin using Incentive Spirometer 1 week prior to surgery.  Use 3 times per day, up to 5 breaths each time.  Bring Incentive Spirometer to hospital.  -Do not bring your own medications to the hospital, except for inhalers and eye drops.  -  Bring your ID and insurance card.    Questions or Concerns:  If you have questions or concerns, please call the  Preoperative Assessment Center, Monday-Friday 7AM-7PM:  [phone]  AFTER YOUR SURGERY  Breathing exercises   Breathing exercises help you  recover faster. Take deep breaths and let the air out slowly. This will:     Help you wake up after surgery.    Help prevent complications like pneumonia.  Preventing complications will help you go home sooner.   We may give you a breathing device (incentive spirometer) to encourage you to breathe deeply.   Nausea and vomiting   You may feel sick to your stomach after surgery; if so, let your nurse know.    Pain control:  After surgery, you may have pain. Our goal is to help you manage your pain. Pain medicine will help you feel comfortable enough to do activities that will help you heal.  These activities may include breathing exercises, walking and physical therapy.   To help your health care team treat your pain we will ask: 1) If you have pain  2) where it is located 3) describe your pain in your words  Methods of pain control include medications given by mouth, vein or by nerve block for some surgeries.  We may give you a pain control pump that will:  1) Deliver the medicine through a tube placed in your vein  2) Control the amount of medicine you receive  3) Allow you to push a button to deliver a dose of pain medicine  Sequential Compression Device (SCD) or Pneumo Boots:  You may need to wear SCD S on your legs or feet. These are wraps connected to a machine that pumps in air and releases it. The repeated pumping helps prevent blood clots from forming.     Using an Incentive Spirometer    An incentive spirometer is a device that helps you do deep breathing exercises. These exercises expand your lungs, aid in circulation, and help prevent pneumonia. Deep breathing exercises also help you breathe better and improve the function of your lungs by:    Keeping your lungs clear    Strengthening your breathing muscles    Helping prevent respiratory complications or problems  The incentive spirometer gives you a way to take an active part in recover. A nurse or therapist will teach you breathing exercises. To do these  exercises, you will breathe in through your mouth and not your nose. The incentive spirometer only works correctly if you breathe in through your mouth.  Steps to clear lungs  Step 1. Exhale normally. Then, inhale normally.    Relax and breathe out.  Step 2. Place your lips tightly around the mouthpiece.    Make sure the device is upright and not tilted.  Step 3. Inhale as much air as you can through the mouthpiece (don't breath through your nose).    Inhale slowly and deeply.    Hold your breath long enough to keep the balls or disk raised for at least 3 to 5 seconds, or as instructed by your healthcare provider.    Some spirometers have an indicator to let you know that you are breathing in too fast. If the indicator goes off, breathe in more slowly.  Step 4. Repeat the exercise regularly.    Do this exercise every hour while you're awake, or as instructed by your healthcare provider.    If you were taught deep breathing and coughing exercises, do them regularly as instructed by your healthcare provider.   Follow-up care  Make a follow up appointment, or as directed. Also, follow up with your healthcare provider as advised or if your symptoms do not improve or continue to get worse.  When to call your healthcare provider  Call your healthcare provider right away if you have any of the following:    Fever 100.4  (38 C) or higher, or as directed by your healthcare provider    Brownish, bloody, or smelly sputum  Call 911  Call 911 if any of these occur:     Shortness of breath that doesn't get better after taking your medicine    Cool, moist, pale or blue skin    Trouble breathing or swallowing, wheezing    Fainting or loss of consciousness    Feeling of fizziness or weakness or a sudden drop in blood pressure    Feeling of doom or lightheaded    Chest pain or rapid heart rate  Date Last Reviewed: 1/1/2017 2000-2017 [employer]. [address] [place] [address], [place] [address]. All rights reserved. This  information is not intended as a substitute for professional medical care. Always follow your healthcare professional's instructions.

## 2017-12-11 NOTE — ANESTHESIA PREPROCEDURE EVALUATION
Anesthesia Evaluation     . Pt has had prior anesthetic. Type: MAC           ROS/MED HX    ENT/Pulmonary:  - neg pulmonary ROS     Neurologic:  - neg neurologic ROS     Cardiovascular:     (+) hypertension----. : . . . :. . Previous cardiac testing date:results:date: results:ECG reviewed date:12/11/2017 results:SR date: results:          METS/Exercise Tolerance:  >4 METS   Hematologic:  - neg hematologic  ROS       Musculoskeletal:   (+) , , other musculoskeletal- left hip pain and incidental find of nerve tumor      GI/Hepatic:     (+) GERD Asymptomatic on medication, Other GI/Hepatic esophageal stricture, dilated in 2003, 2005      Renal/Genitourinary:  - ROS Renal section negative       Endo:  - neg endo ROS       Psychiatric:  - neg psychiatric ROS       Infectious Disease:  - neg infectious disease ROS       Malignancy:      - no malignancy   Other:    (+) No chance of pregnancy C-spine cleared: N/A, no H/O Chronic Pain,no other significant disability                    Physical Exam  Normal systems: cardiovascular, pulmonary and dental    Airway   Mallampati: II  TM distance: >3 FB  Neck ROM: full    Dental     Cardiovascular   Rhythm and rate: regular and normal      Pulmonary    breath sounds clear to auscultation    Other findings:   Results for MACHELLECARRIE (MRN [id number]) as of 12/11/2017 12:28    12/11/2017 11:18  Sodium: 139  Potassium: 3.5  Chloride: 105  Carbon Dioxide: 28  Urea Nitrogen: 12  Creatinine: 0.86  GFR Estimate: >90  GFR Estimate If Black: >90  Calcium: 8.8  Anion Gap: 7  Glucose: 102 (H)  WBC: 8.0  Hemoglobin: 16.3  Hematocrit: 46.8  Platelet Count: PENDING  RBC Count: 5.22  MCV: 90  MCH: 31.2  MCHC: 34.8  RDW: 11.9  INR: 1.04  ABO: PENDING  Antibody Screen: PENDING  Test Valid Only At: [facility]  Specimen Expires: 12/14/2017 12/11/2017 11:22  Color Urine: Straw  Appearance Urine: Clear  Glucose Urine: Negative  Bilirubin Urine: Negative  Ketones Urine:  Negative  Specific Gravity Urine: 1.005  pH Urine: 9.0 (H)  Protein Albumin Urine: Negative  Urobilinogen mg/dL: 0.0  Nitrite Urine: Negative  Blood Urine: Negative  Leukocyte Esterase Urine: Negative  Source: Midstream Urine           PAC Discussion and Assessment    ASA Classification: 3  Case is suitable for: [place]  Anesthetic techniques and relevant risks discussed: GA  Invasive monitoring and risk discussed: Yes  Types:   Possibility and Risk of blood transfusion discussed: Yes  NPO instructions given:   Additional anesthetic preparation and risks discussed:   Needs early admission to pre-op area:   Other:     PAC Resident/NP Anesthesia Assessment:  Sergio Cadena is a 48 yo scheduled for Lumbar 2-3 Laminectomy with Removal of Tumor  on 12/18/2017 by Dr. Cartagena. PAC referral by Dr. Cartagena for assessment and optimization of anesthesia. Previous anesthesia, only MAC, without complications.    1) Cardiac:HTN, well managed with Lisinopril and HCTZ. EKG today NSR, possible LVH. Walks/jogs 1-2 miles daily without cardiac symptoms.  2) Pulmonary: Never smoked, denies pulmonary symptoms.  3) GI: GERD, managed with Prilosec. History of esophageal stricture, dilated in 2003 and 2005 and has not had further issues.  4) MSK: left hip pain, His MRI dated 05/15/2017 demonstrated an approximately 5 mm enhancing mass at L2-3.  5) Endo: BMI 33.5     Feasible airway    Pt also evaluated by Dr. Estevez. Please see recommendations below. Labs drawn today.  I spent 20 minutes face to face with pt, assessing, examining, and educating        Reviewed and Signed by PAC Mid-Level Provider/Resident  Mid-Level Provider/Resident: ERIK Alcala  Date: 12/11/2017  Time: 1030    Attending Anesthesiologist Anesthesia Assessment:        Anesthesiologist:   Date:   Time:   Pass/Fail:   Disposition:     PAC Pharmacist Assessment:        Pharmacist:   Date:   Time:      Anesthesia Plan      History & Physical Review      ASA Status:  2 .     NPO Status:  > 8 hours    Plan for General with Intravenous induction. Maintenance will be Balanced.    PONV prophylaxis:  Ondansetron (or other 5HT-3)  Additional equipment: Videolaryngoscope and 2nd IV      Postoperative Care  Postoperative pain management:  IV analgesics.      Consents  Anesthetic plan, risks, benefits and alternatives discussed with:  Patient..                          .

## 2017-12-11 NOTE — H&P
Pre-Operative H & P     CC:  Preoperative exam to assess for increased cardiopulmonary risk while undergoing surgery and anesthesia.    Date of Encounter: 12/11/2017  Primary Care Physician:  Leda Shanks  Sergio Cadena is a 47 year old male who presents for pre-operative H & P in preparation for Lumbar 2-3 Laminectomy with Removal of Tumorwith Dr. Cartagena on 12/18/2017 at [facility].     History is obtained from the patient.    Left hip pain, His MRI dated 05/15/2017 demonstrated an approximately 5 mm enhancing mass at L2-3. Evaluated by Dr. Cartagena and above procedure planned.      Past Medical History  Past Medical History:   Diagnosis Date     Esophageal reflux     EGD 9/05     Nerve sheath tumor vs meningioma 5/16/2017     Stricture and stenosis of esophagus     Dilated 9/05       Past Surgical History  Past Surgical History:   Procedure Laterality Date     Fracture left elbow repaired by casting  1980     Gastroscopy  &  Esophageal dilatation  7/9/2003       Hx of Blood transfusions/reactions: none    Hx of abnormal bleeding or anti-platelet use: none    Menstrual history: No LMP for male patient.:     Steroid use in the last year: none    Personal or FH with difficulty with Anesthesia:  MAC only, no history of GA        Prior to Admission Medications  Current Outpatient Prescriptions   Medication Sig Dispense Refill     omeprazole (PRILOSEC) 20 MG CR capsule Take 20 mg by mouth every evening   3     lisinopril (PRINIVIL/ZESTRIL) 40 MG tablet Take 1 tablet (40 mg) by mouth daily (Patient taking differently: Take 40 mg by mouth every evening ) 90 tablet 3     hydrochlorothiazide (HYDRODIURIL) 25 MG tablet Take 1 tablet (25 mg) by mouth daily (Patient taking differently: Take 25 mg by mouth every evening ) 90 tablet 1     Multiple Vitamins-Minerals (MULTIVITAMIN ADULTS PO) Take 1 tablet by mouth every morning          Allergies  No Known Allergies        Social History  Social History     Social History     Marital status:      Spouse name: Maddison Cadena     Number of children: 3     Years of education: 16     Occupational History      [employer]     Social History Main Topics     Smoking status: Never Smoker     Smokeless tobacco: Never Used     Alcohol use 1.2 - 1.8 oz/week     0 Standard drinks or equivalent, 2 - 3 Cans of beer per week      Comment: WEEKENDS     Drug use: No     Sexual activity: Yes     Partners: Female     Other Topics Concern     Parent/Sibling W/ Cabg, Mi Or Angioplasty Before 65f 55m? No     Social History Narrative       Family History  No family history on file.          Anesthesia Evaluation     . Pt has had prior anesthetic. Type: MAC           ROS/MED HX    ENT/Pulmonary:  - neg pulmonary ROS     Neurologic:  - neg neurologic ROS     Cardiovascular:     (+) hypertension----. : . . . :. .       METS/Exercise Tolerance:  >4 METS   Hematologic:  - neg hematologic  ROS       Musculoskeletal:   (+) , , other musculoskeletal- left hip pain and incidental find of nerve tumor      GI/Hepatic:     (+) GERD Asymptomatic on medication, Other GI/Hepatic esophageal stricture, dilated in 2003, 2005      Renal/Genitourinary:  - ROS Renal section negative       Endo:  - neg endo ROS       Psychiatric:  - neg psychiatric ROS       Infectious Disease:  - neg infectious disease ROS       Malignancy:      - no malignancy   Other:    (+) No chance of pregnancy C-spine cleared: N/A, no H/O Chronic Pain,no other significant disability              Physical Exam  Normal systems: cardiovascular, pulmonary and dental    Airway   Mallampati: II  TM distance: >3 FB  Neck ROM: full    Dental   Normal    Cardiovascular   Rhythm and rate: regular and normal      Pulmonary    breath sounds clear to auscultation               The complete review of systems is negative other than noted in the HPI or here.   Temp: 98.2  F (36.8  C) Temp  "src: Oral BP: 161/84 Pulse: 91   Resp: 16 SpO2: 95 %         260 lbs 4.8 oz  6' 1.5\"   Body mass index is 33.88 kg/(m^2).       Physical Exam  Constitutional: Awake, alert, cooperative, no apparent distress, and appears stated age.  Eyes: Pupils equal, round and reactive to light, extra ocular muscles intact, sclera clear, conjunctiva normal.  HENT: Normocephalic, oral pharynx with moist mucus membranes, good dentition. No goiter appreciated.   Respiratory: Clear to auscultation bilaterally, no crackles or wheezing.  Cardiovascular: Regular rate and rhythm, normal S1 and S2, and no murmur noted.  Carotids +2, no bruits. No edema. Palpable pulses to radial  DP and PT arteries.   GI: Normal bowel sounds, soft, non-distended, non-tender, no masses palpated, no hepatosplenomegaly.    Lymph/Hematologic: No cervical lymphadenopathy and no supraclavicular lymphadenopathy.  Genitourinary:  deferred  Skin: Warm and dry.  No rashes at anticipated surgical site.   Musculoskeletal: Full ROM of neck. There is no redness, warmth, or swelling of the joints. Gross motor strength is normal.    Neurologic: Awake, alert, oriented to name, place and time. Cranial nerves II-XII are grossly intact. Gait is normal.   Neuropsychiatric: Calm, cooperative. Normal affect.     Labs: (personally reviewed)  Results for CARRIE CARTY (MRN [id number]) as of 12/11/2017 12:28   Ref. Range 12/11/2017 11:18 12/11/2017 11:22   Sodium Latest Ref Range: 133 - 144 mmol/L 139    Potassium Latest Ref Range: 3.4 - 5.3 mmol/L 3.5    Chloride Latest Ref Range: 94 - 109 mmol/L 105    Carbon Dioxide Latest Ref Range: 20 - 32 mmol/L 28    Urea Nitrogen Latest Ref Range: 7 - 30 mg/dL 12    Creatinine Latest Ref Range: 0.66 - 1.25 mg/dL 0.86    GFR Estimate Latest Ref Range: >60 mL/min/1.7m2 >90    GFR Estimate If Black Latest Ref Range: >60 mL/min/1.7m2 >90    Calcium Latest Ref Range: 8.5 - 10.1 mg/dL 8.8    Anion Gap Latest Ref Range: 3 - 14 mmol/L 7  "   Glucose Latest Ref Range: 70 - 99 mg/dL 102 (H)    WBC Latest Ref Range: 4.0 - 11.0 10e9/L 8.0    Hemoglobin Latest Ref Range: 13.3 - 17.7 g/dL 16.3    Hematocrit Latest Ref Range: 40.0 - 53.0 % 46.8    Platelet Count Latest Ref Range: 150 - 450 10e9/L PENDING    RBC Count Latest Ref Range: 4.4 - 5.9 10e12/L 5.22    MCV Latest Ref Range: 78 - 100 fl 90    MCH Latest Ref Range: 26.5 - 33.0 pg 31.2    MCHC Latest Ref Range: 31.5 - 36.5 g/dL 34.8    RDW Latest Ref Range: 10.0 - 15.0 % 11.9    INR Latest Ref Range: 0.86 - 1.14  1.04    ABO Unknown PENDING    Antibody Screen Unknown PENDING    Test Valid Only At Latest Units:     [facility]    Specimen Expires Unknown 2017    Color Urine Unknown  Straw   Appearance Urine Unknown  Clear   Glucose Urine Latest Ref Range: NEG^Negative mg/dL  Negative   Bilirubin Urine Latest Ref Range: NEG^Negative   Negative   Ketones Urine Latest Ref Range: NEG^Negative mg/dL  Negative   Specific Gravity Urine Latest Ref Range: 1.003 - 1.035   1.005   pH Urine Latest Ref Range: 5.0 - 7.0 pH  9.0 (H)   Protein Albumin Urine Latest Ref Range: NEG^Negative mg/dL  Negative   Urobilinogen mg/dL Latest Ref Range: 0.0 - 2.0 mg/dL  0.0   Nitrite Urine Latest Ref Range: NEG^Negative   Negative   Blood Urine Latest Ref Range: NEG^Negative   Negative   Leukocyte Esterase Urine Latest Ref Range: NEG^Negative   Negative   Source Unknown  Midstream Urine       EKG: Personally reviewed but formal cardiology read pendin2017 NSR, possible LVH      ASSESSMENT and PLAN  Sergio Cadena is a 47 year old male scheduled to undergo Lumbar 2-3 Laminectomy with Removal of Tumor. He has the following specific operative considerations:   - RCRI : Low serious cardiac risks.  0.4% risk of major adverse cardiac event.   - Anesthesia considerations:  Refer to PAC assessment in anesthesia records  - VTE risk: low risk  - AMINATA # of risks 2/8 = low risk  - Post-op delirium risk: lo risk  -  "Risk of PONV score = 2.  If > 2, anti-emetic intervention recommended.    Previous anesthesia, only MAC, without complications.  1) Cardiac:HTN, well managed with Lisinopril and HCTZ. EKG today NSR, possible LVH. Walks/jogs 1-2 miles daily without cardiac symptoms.  2) Pulmonary: Never smoked, denies pulmonary symptoms.  3) GI: GERD, managed with Prilosec. History of esophageal stricture, dilated in 2003 and 2005 and has not had further issues.  4) MSK: left hip pain, His MRI dated 05/15/2017 demonstrated an approximately 5 mm enhancing mass at L2-3.  5) Endo: BMI 33.5    Feasible airway  Pt optimized for surgery. AVS with information on surgery time/arrival time, meds and NPO status given by nursing staff      Patient was discussed with Dr Estevez.    KAILEY Queen CNS  Preoperative Assessment Center  [facility]  Phone: [phone]  Fax: [phone]    Sergio Cadena is a 47 year old male patient born on 1970.  1. Preop general physical exam      Past Medical History:   Diagnosis Date     Esophageal reflux     EGD 9/05     Nerve sheath tumor vs meningioma 5/16/2017     Stricture and stenosis of esophagus     Dilated 9/05     No Known Allergies  Active Problems:    * No active hospital problems. *    Blood pressure 161/84, pulse 91, temperature 98.2  F (36.8  C), temperature source Oral, resp. rate 16, height 1.867 m (6' 1.5\"), weight 118.1 kg (260 lb 4.8 oz), SpO2 95 %.    NICU Admission  Maternal Medical History  Assessment & Plan    Annette Ocampo  12/11/2017    "

## 2017-12-12 LAB — INTERPRETATION ECG - MUSE: NORMAL

## 2017-12-18 ENCOUNTER — APPOINTMENT (OUTPATIENT)
Dept: GENERAL RADIOLOGY | Facility: CLINIC | Age: 47
DRG: 520 | End: 2017-12-18
Attending: NEUROLOGICAL SURGERY
Payer: COMMERCIAL

## 2017-12-18 ENCOUNTER — HOSPITAL ENCOUNTER (INPATIENT)
Facility: CLINIC | Age: 47
LOS: 3 days | Discharge: HOME | DRG: 520 | End: 2017-12-21
Attending: NEUROLOGICAL SURGERY | Admitting: NEUROLOGICAL SURGERY
Payer: COMMERCIAL

## 2017-12-18 ENCOUNTER — ANESTHESIA (OUTPATIENT)
Dept: SURGERY | Facility: CLINIC | Age: 47
DRG: 520 | End: 2017-12-18
Payer: COMMERCIAL

## 2017-12-18 DIAGNOSIS — Z98.890 S/P LAMINECTOMY: Primary | ICD-10-CM

## 2017-12-18 PROBLEM — D49.7 INTRADURAL TUMOR: Status: ACTIVE | Noted: 2017-12-18

## 2017-12-18 LAB
ABO + RH BLD: NORMAL
ABO + RH BLD: NORMAL
BLD GP AB SCN SERPL QL: NORMAL
BLOOD BANK CMNT PATIENT-IMP: NORMAL
BLOOD BANK CMNT PATIENT-IMP: NORMAL
GLUCOSE BLDC GLUCOMTR-MCNC: 106 MG/DL (ref 70–99)
POTASSIUM SERPL-SCNC: 3.6 MMOL/L (ref 3.4–5.3)
SPECIMEN EXP DATE BLD: NORMAL

## 2017-12-18 PROCEDURE — 25000132 ZZH RX MED GY IP 250 OP 250 PS 637: Performed by: STUDENT IN AN ORGANIZED HEALTH CARE EDUCATION/TRAINING PROGRAM

## 2017-12-18 PROCEDURE — 12000008 ZZH R&B INTERMEDIATE UMMC

## 2017-12-18 PROCEDURE — 25000128 H RX IP 250 OP 636: Performed by: ANESTHESIOLOGY

## 2017-12-18 PROCEDURE — 40000170 ZZH STATISTIC PRE-PROCEDURE ASSESSMENT II: Performed by: NEUROLOGICAL SURGERY

## 2017-12-18 PROCEDURE — 00000146 ZZHCL STATISTIC GLUCOSE BY METER IP

## 2017-12-18 PROCEDURE — 25000128 H RX IP 250 OP 636: Performed by: NURSE ANESTHETIST, CERTIFIED REGISTERED

## 2017-12-18 PROCEDURE — 25000125 ZZHC RX 250: Performed by: NEUROLOGICAL SURGERY

## 2017-12-18 PROCEDURE — 37000008 ZZH ANESTHESIA TECHNICAL FEE, 1ST 30 MIN: Performed by: NEUROLOGICAL SURGERY

## 2017-12-18 PROCEDURE — 36415 COLL VENOUS BLD VENIPUNCTURE: CPT | Performed by: ANESTHESIOLOGY

## 2017-12-18 PROCEDURE — 84132 ASSAY OF SERUM POTASSIUM: CPT | Performed by: ANESTHESIOLOGY

## 2017-12-18 PROCEDURE — P9041 ALBUMIN (HUMAN),5%, 50ML: HCPCS | Performed by: NURSE ANESTHETIST, CERTIFIED REGISTERED

## 2017-12-18 PROCEDURE — 36000072 ZZH SURGERY LEVEL 5 W FLUORO 1ST 30 MIN - UMMC: Performed by: NEUROLOGICAL SURGERY

## 2017-12-18 PROCEDURE — 4A11X4G MONITORING OF PERIPHERAL NERVOUS ELECTRICAL ACTIVITY, INTRAOPERATIVE, EXTERNAL APPROACH: ICD-10-PCS | Performed by: NEUROLOGICAL SURGERY

## 2017-12-18 PROCEDURE — 71000015 ZZH RECOVERY PHASE 1 LEVEL 2 EA ADDTL HR: Performed by: NEUROLOGICAL SURGERY

## 2017-12-18 PROCEDURE — 71000014 ZZH RECOVERY PHASE 1 LEVEL 2 FIRST HR: Performed by: NEUROLOGICAL SURGERY

## 2017-12-18 PROCEDURE — 27210995 ZZH RX 272: Performed by: NEUROLOGICAL SURGERY

## 2017-12-18 PROCEDURE — 25000565 ZZH ISOFLURANE, EA 15 MIN: Performed by: NEUROLOGICAL SURGERY

## 2017-12-18 PROCEDURE — 27211022 ZZHC OR IOM SUPPLIES OPNP: Performed by: NEUROLOGICAL SURGERY

## 2017-12-18 PROCEDURE — 92200047 ZZHC NEURO MONITORING SERVICE, UP TO 7 HOURS (T1FEE): Performed by: NEUROLOGICAL SURGERY

## 2017-12-18 PROCEDURE — 25000128 H RX IP 250 OP 636: Performed by: NEUROLOGICAL SURGERY

## 2017-12-18 PROCEDURE — 37000009 ZZH ANESTHESIA TECHNICAL FEE, EACH ADDTL 15 MIN: Performed by: NEUROLOGICAL SURGERY

## 2017-12-18 PROCEDURE — 25000128 H RX IP 250 OP 636: Performed by: STUDENT IN AN ORGANIZED HEALTH CARE EDUCATION/TRAINING PROGRAM

## 2017-12-18 PROCEDURE — 88307 TISSUE EXAM BY PATHOLOGIST: CPT | Performed by: NEUROLOGICAL SURGERY

## 2017-12-18 PROCEDURE — 25000125 ZZHC RX 250: Performed by: NURSE ANESTHETIST, CERTIFIED REGISTERED

## 2017-12-18 PROCEDURE — 00BT0ZZ EXCISION OF SPINAL MENINGES, OPEN APPROACH: ICD-10-PCS | Performed by: NEUROLOGICAL SURGERY

## 2017-12-18 PROCEDURE — 36000070 ZZH SURGERY LEVEL 5 EA 15 ADDTL MIN - UMMC: Performed by: NEUROLOGICAL SURGERY

## 2017-12-18 PROCEDURE — 40000277 XR SURGERY CARM FLUORO LESS THAN 5 MIN W STILLS: Mod: TC

## 2017-12-18 PROCEDURE — 27210794 ZZH OR GENERAL SUPPLY STERILE: Performed by: NEUROLOGICAL SURGERY

## 2017-12-18 RX ORDER — OXYCODONE HYDROCHLORIDE 5 MG/1
5-10 TABLET ORAL
Status: DISCONTINUED | OUTPATIENT
Start: 2017-12-18 | End: 2017-12-21 | Stop reason: HOSPADM

## 2017-12-18 RX ORDER — ACETAMINOPHEN 325 MG/1
975 TABLET ORAL EVERY 8 HOURS
Status: COMPLETED | OUTPATIENT
Start: 2017-12-18 | End: 2017-12-21

## 2017-12-18 RX ORDER — PROPOFOL 10 MG/ML
INJECTION, EMULSION INTRAVENOUS PRN
Status: DISCONTINUED | OUTPATIENT
Start: 2017-12-18 | End: 2017-12-18

## 2017-12-18 RX ORDER — ALBUMIN, HUMAN INJ 5% 5 %
SOLUTION INTRAVENOUS CONTINUOUS PRN
Status: DISCONTINUED | OUTPATIENT
Start: 2017-12-18 | End: 2017-12-18

## 2017-12-18 RX ORDER — SODIUM CHLORIDE, SODIUM LACTATE, POTASSIUM CHLORIDE, CALCIUM CHLORIDE 600; 310; 30; 20 MG/100ML; MG/100ML; MG/100ML; MG/100ML
INJECTION, SOLUTION INTRAVENOUS CONTINUOUS
Status: DISCONTINUED | OUTPATIENT
Start: 2017-12-18 | End: 2017-12-18 | Stop reason: HOSPADM

## 2017-12-18 RX ORDER — ACETAMINOPHEN 325 MG/1
650 TABLET ORAL EVERY 4 HOURS PRN
Status: DISCONTINUED | OUTPATIENT
Start: 2017-12-21 | End: 2017-12-21 | Stop reason: HOSPADM

## 2017-12-18 RX ORDER — SODIUM CHLORIDE 9 MG/ML
INJECTION, SOLUTION INTRAVENOUS CONTINUOUS
Status: ACTIVE | OUTPATIENT
Start: 2017-12-18 | End: 2017-12-19

## 2017-12-18 RX ORDER — ONDANSETRON 4 MG/1
4 TABLET, ORALLY DISINTEGRATING ORAL EVERY 6 HOURS PRN
Status: DISCONTINUED | OUTPATIENT
Start: 2017-12-18 | End: 2017-12-21 | Stop reason: HOSPADM

## 2017-12-18 RX ORDER — ONDANSETRON 2 MG/ML
4 INJECTION INTRAMUSCULAR; INTRAVENOUS EVERY 30 MIN PRN
Status: DISCONTINUED | OUTPATIENT
Start: 2017-12-18 | End: 2017-12-18 | Stop reason: HOSPADM

## 2017-12-18 RX ORDER — NALOXONE HYDROCHLORIDE 0.4 MG/ML
.1-.4 INJECTION, SOLUTION INTRAMUSCULAR; INTRAVENOUS; SUBCUTANEOUS
Status: DISCONTINUED | OUTPATIENT
Start: 2017-12-18 | End: 2017-12-21 | Stop reason: HOSPADM

## 2017-12-18 RX ORDER — MEPERIDINE HYDROCHLORIDE 50 MG/ML
12.5 INJECTION INTRAMUSCULAR; INTRAVENOUS; SUBCUTANEOUS
Status: DISCONTINUED | OUTPATIENT
Start: 2017-12-18 | End: 2017-12-18 | Stop reason: HOSPADM

## 2017-12-18 RX ORDER — AMOXICILLIN 250 MG
1-2 CAPSULE ORAL 2 TIMES DAILY
Status: DISCONTINUED | OUTPATIENT
Start: 2017-12-18 | End: 2017-12-21 | Stop reason: HOSPADM

## 2017-12-18 RX ORDER — CEFAZOLIN SODIUM 1 G/3ML
1 INJECTION, POWDER, FOR SOLUTION INTRAMUSCULAR; INTRAVENOUS SEE ADMIN INSTRUCTIONS
Status: DISCONTINUED | OUTPATIENT
Start: 2017-12-18 | End: 2017-12-18 | Stop reason: HOSPADM

## 2017-12-18 RX ORDER — LIDOCAINE 40 MG/G
CREAM TOPICAL
Status: DISCONTINUED | OUTPATIENT
Start: 2017-12-18 | End: 2017-12-18 | Stop reason: HOSPADM

## 2017-12-18 RX ORDER — FENTANYL CITRATE 50 UG/ML
INJECTION, SOLUTION INTRAMUSCULAR; INTRAVENOUS PRN
Status: DISCONTINUED | OUTPATIENT
Start: 2017-12-18 | End: 2017-12-18

## 2017-12-18 RX ORDER — HYDROMORPHONE HYDROCHLORIDE 1 MG/ML
.3-.5 INJECTION, SOLUTION INTRAMUSCULAR; INTRAVENOUS; SUBCUTANEOUS
Status: DISCONTINUED | OUTPATIENT
Start: 2017-12-18 | End: 2017-12-20

## 2017-12-18 RX ORDER — ONDANSETRON 4 MG/1
4 TABLET, ORALLY DISINTEGRATING ORAL EVERY 30 MIN PRN
Status: DISCONTINUED | OUTPATIENT
Start: 2017-12-18 | End: 2017-12-18 | Stop reason: HOSPADM

## 2017-12-18 RX ORDER — HYDROCHLOROTHIAZIDE 25 MG/1
25 TABLET ORAL EVERY EVENING
Status: DISCONTINUED | OUTPATIENT
Start: 2017-12-18 | End: 2017-12-21 | Stop reason: HOSPADM

## 2017-12-18 RX ORDER — DEXAMETHASONE SODIUM PHOSPHATE 10 MG/ML
INJECTION, SOLUTION INTRAMUSCULAR; INTRAVENOUS PRN
Status: DISCONTINUED | OUTPATIENT
Start: 2017-12-18 | End: 2017-12-18

## 2017-12-18 RX ORDER — BUPIVACAINE HYDROCHLORIDE AND EPINEPHRINE 2.5; 5 MG/ML; UG/ML
INJECTION, SOLUTION INFILTRATION; PERINEURAL PRN
Status: DISCONTINUED | OUTPATIENT
Start: 2017-12-18 | End: 2017-12-18 | Stop reason: HOSPADM

## 2017-12-18 RX ORDER — GLYCOPYRROLATE 0.2 MG/ML
INJECTION, SOLUTION INTRAMUSCULAR; INTRAVENOUS PRN
Status: DISCONTINUED | OUTPATIENT
Start: 2017-12-18 | End: 2017-12-18

## 2017-12-18 RX ORDER — FENTANYL CITRATE 50 UG/ML
25-50 INJECTION, SOLUTION INTRAMUSCULAR; INTRAVENOUS
Status: DISCONTINUED | OUTPATIENT
Start: 2017-12-18 | End: 2017-12-18 | Stop reason: HOSPADM

## 2017-12-18 RX ORDER — EPHEDRINE SULFATE 50 MG/ML
INJECTION, SOLUTION INTRAMUSCULAR; INTRAVENOUS; SUBCUTANEOUS PRN
Status: DISCONTINUED | OUTPATIENT
Start: 2017-12-18 | End: 2017-12-18

## 2017-12-18 RX ORDER — PROCHLORPERAZINE MALEATE 5 MG
10 TABLET ORAL EVERY 6 HOURS PRN
Status: DISCONTINUED | OUTPATIENT
Start: 2017-12-18 | End: 2017-12-21 | Stop reason: HOSPADM

## 2017-12-18 RX ORDER — LIDOCAINE HYDROCHLORIDE 20 MG/ML
INJECTION, SOLUTION INFILTRATION; PERINEURAL PRN
Status: DISCONTINUED | OUTPATIENT
Start: 2017-12-18 | End: 2017-12-18

## 2017-12-18 RX ORDER — ONDANSETRON 2 MG/ML
INJECTION INTRAMUSCULAR; INTRAVENOUS PRN
Status: DISCONTINUED | OUTPATIENT
Start: 2017-12-18 | End: 2017-12-18

## 2017-12-18 RX ORDER — ONDANSETRON 2 MG/ML
4 INJECTION INTRAMUSCULAR; INTRAVENOUS EVERY 6 HOURS PRN
Status: DISCONTINUED | OUTPATIENT
Start: 2017-12-18 | End: 2017-12-21 | Stop reason: HOSPADM

## 2017-12-18 RX ORDER — HYDROMORPHONE HYDROCHLORIDE 1 MG/ML
.3-.5 INJECTION, SOLUTION INTRAMUSCULAR; INTRAVENOUS; SUBCUTANEOUS EVERY 10 MIN PRN
Status: DISCONTINUED | OUTPATIENT
Start: 2017-12-18 | End: 2017-12-18 | Stop reason: HOSPADM

## 2017-12-18 RX ORDER — LISINOPRIL 40 MG/1
40 TABLET ORAL EVERY EVENING
Status: DISCONTINUED | OUTPATIENT
Start: 2017-12-18 | End: 2017-12-21 | Stop reason: HOSPADM

## 2017-12-18 RX ORDER — NALOXONE HYDROCHLORIDE 0.4 MG/ML
.1-.4 INJECTION, SOLUTION INTRAMUSCULAR; INTRAVENOUS; SUBCUTANEOUS
Status: DISCONTINUED | OUTPATIENT
Start: 2017-12-18 | End: 2017-12-18 | Stop reason: HOSPADM

## 2017-12-18 RX ORDER — CYCLOBENZAPRINE HCL 10 MG
10 TABLET ORAL 3 TIMES DAILY
Status: DISCONTINUED | OUTPATIENT
Start: 2017-12-18 | End: 2017-12-21 | Stop reason: HOSPADM

## 2017-12-18 RX ORDER — BUPIVACAINE HYDROCHLORIDE AND EPINEPHRINE 5; 5 MG/ML; UG/ML
INJECTION, SOLUTION PERINEURAL PRN
Status: DISCONTINUED | OUTPATIENT
Start: 2017-12-18 | End: 2017-12-18 | Stop reason: HOSPADM

## 2017-12-18 RX ORDER — PROPOFOL 10 MG/ML
INJECTION, EMULSION INTRAVENOUS CONTINUOUS PRN
Status: DISCONTINUED | OUTPATIENT
Start: 2017-12-18 | End: 2017-12-18

## 2017-12-18 RX ORDER — CEFAZOLIN SODIUM 2 G/100ML
2 INJECTION, SOLUTION INTRAVENOUS
Status: COMPLETED | OUTPATIENT
Start: 2017-12-18 | End: 2017-12-18

## 2017-12-18 RX ORDER — LIDOCAINE 40 MG/G
CREAM TOPICAL
Status: DISCONTINUED | OUTPATIENT
Start: 2017-12-18 | End: 2017-12-21 | Stop reason: HOSPADM

## 2017-12-18 RX ADMIN — PROPOFOL: 10 INJECTION, EMULSION INTRAVENOUS at 09:55

## 2017-12-18 RX ADMIN — OMEPRAZOLE 20 MG: 20 CAPSULE, DELAYED RELEASE ORAL at 21:10

## 2017-12-18 RX ADMIN — PHENYLEPHRINE HYDROCHLORIDE 200 MCG: 10 INJECTION, SOLUTION INTRAMUSCULAR; INTRAVENOUS; SUBCUTANEOUS at 08:07

## 2017-12-18 RX ADMIN — PROPOFOL 200 MG: 10 INJECTION, EMULSION INTRAVENOUS at 07:52

## 2017-12-18 RX ADMIN — CEFAZOLIN SODIUM 2 G: 2 INJECTION, SOLUTION INTRAVENOUS at 08:30

## 2017-12-18 RX ADMIN — PROPOFOL 75 MCG/KG/MIN: 10 INJECTION, EMULSION INTRAVENOUS at 07:55

## 2017-12-18 RX ADMIN — ACETAMINOPHEN 975 MG: 325 TABLET, FILM COATED ORAL at 18:44

## 2017-12-18 RX ADMIN — SENNOSIDES AND DOCUSATE SODIUM 1 TABLET: 8.6; 5 TABLET ORAL at 21:10

## 2017-12-18 RX ADMIN — Medication 0.3 MG: at 12:45

## 2017-12-18 RX ADMIN — HYDROCHLOROTHIAZIDE 25 MG: 25 TABLET ORAL at 21:09

## 2017-12-18 RX ADMIN — PHENYLEPHRINE HYDROCHLORIDE 100 MCG: 10 INJECTION, SOLUTION INTRAMUSCULAR; INTRAVENOUS; SUBCUTANEOUS at 08:11

## 2017-12-18 RX ADMIN — OXYCODONE HYDROCHLORIDE 10 MG: 5 TABLET ORAL at 21:09

## 2017-12-18 RX ADMIN — CYCLOBENZAPRINE HYDROCHLORIDE 10 MG: 10 TABLET, FILM COATED ORAL at 21:30

## 2017-12-18 RX ADMIN — FENTANYL CITRATE 50 MCG: 50 INJECTION, SOLUTION INTRAMUSCULAR; INTRAVENOUS at 07:50

## 2017-12-18 RX ADMIN — Medication 100 MG: at 07:52

## 2017-12-18 RX ADMIN — ALBUMIN HUMAN: 0.05 INJECTION, SOLUTION INTRAVENOUS at 09:50

## 2017-12-18 RX ADMIN — PHENYLEPHRINE HYDROCHLORIDE 100 MCG: 10 INJECTION, SOLUTION INTRAMUSCULAR; INTRAVENOUS; SUBCUTANEOUS at 08:36

## 2017-12-18 RX ADMIN — Medication 0.5 MG: at 18:44

## 2017-12-18 RX ADMIN — FENTANYL CITRATE 100 MCG: 50 INJECTION, SOLUTION INTRAMUSCULAR; INTRAVENOUS at 09:06

## 2017-12-18 RX ADMIN — CEFAZOLIN 1 G: 1 INJECTION, POWDER, FOR SOLUTION INTRAMUSCULAR; INTRAVENOUS at 10:30

## 2017-12-18 RX ADMIN — HYDROMORPHONE HYDROCHLORIDE 0.5 MG: 1 INJECTION, SOLUTION INTRAMUSCULAR; INTRAVENOUS; SUBCUTANEOUS at 11:09

## 2017-12-18 RX ADMIN — SODIUM CHLORIDE, POTASSIUM CHLORIDE, SODIUM LACTATE AND CALCIUM CHLORIDE: 600; 310; 30; 20 INJECTION, SOLUTION INTRAVENOUS at 08:00

## 2017-12-18 RX ADMIN — PHENYLEPHRINE HYDROCHLORIDE 100 MCG: 10 INJECTION, SOLUTION INTRAMUSCULAR; INTRAVENOUS; SUBCUTANEOUS at 08:46

## 2017-12-18 RX ADMIN — Medication 0.2 MG: at 08:16

## 2017-12-18 RX ADMIN — LISINOPRIL 40 MG: 40 TABLET ORAL at 21:09

## 2017-12-18 RX ADMIN — DEXAMETHASONE SODIUM PHOSPHATE 8 MG: 10 INJECTION, SOLUTION INTRAMUSCULAR; INTRAVENOUS at 08:28

## 2017-12-18 RX ADMIN — PHENYLEPHRINE HYDROCHLORIDE 1 MCG/KG/MIN: 10 INJECTION, SOLUTION INTRAMUSCULAR; INTRAVENOUS; SUBCUTANEOUS at 08:53

## 2017-12-18 RX ADMIN — Medication 0.5 MG: at 16:23

## 2017-12-18 RX ADMIN — Medication 5 MG: at 08:30

## 2017-12-18 RX ADMIN — PHENYLEPHRINE HYDROCHLORIDE 200 MCG: 10 INJECTION, SOLUTION INTRAMUSCULAR; INTRAVENOUS; SUBCUTANEOUS at 08:16

## 2017-12-18 RX ADMIN — SODIUM CHLORIDE: 9 INJECTION, SOLUTION INTRAVENOUS at 12:44

## 2017-12-18 RX ADMIN — Medication 5 MG: at 08:06

## 2017-12-18 RX ADMIN — SODIUM CHLORIDE, POTASSIUM CHLORIDE, SODIUM LACTATE AND CALCIUM CHLORIDE: 600; 310; 30; 20 INJECTION, SOLUTION INTRAVENOUS at 07:42

## 2017-12-18 RX ADMIN — LIDOCAINE HYDROCHLORIDE 100 MG: 20 INJECTION, SOLUTION INFILTRATION; PERINEURAL at 07:52

## 2017-12-18 RX ADMIN — FENTANYL CITRATE 100 MCG: 50 INJECTION, SOLUTION INTRAMUSCULAR; INTRAVENOUS at 08:29

## 2017-12-18 RX ADMIN — PHENYLEPHRINE HYDROCHLORIDE 200 MCG: 10 INJECTION, SOLUTION INTRAMUSCULAR; INTRAVENOUS; SUBCUTANEOUS at 08:53

## 2017-12-18 RX ADMIN — MIDAZOLAM 2 MG: 1 INJECTION INTRAMUSCULAR; INTRAVENOUS at 07:42

## 2017-12-18 RX ADMIN — ONDANSETRON 4 MG: 2 INJECTION INTRAMUSCULAR; INTRAVENOUS at 10:56

## 2017-12-18 RX ADMIN — FENTANYL CITRATE 50 MCG: 50 INJECTION, SOLUTION INTRAMUSCULAR; INTRAVENOUS at 07:52

## 2017-12-18 RX ADMIN — PHENYLEPHRINE HYDROCHLORIDE 200 MCG: 10 INJECTION, SOLUTION INTRAMUSCULAR; INTRAVENOUS; SUBCUTANEOUS at 08:27

## 2017-12-18 RX ADMIN — Medication 5 MG: at 08:01

## 2017-12-18 RX ADMIN — PHENYLEPHRINE HYDROCHLORIDE 200 MCG: 10 INJECTION, SOLUTION INTRAMUSCULAR; INTRAVENOUS; SUBCUTANEOUS at 08:26

## 2017-12-18 RX ADMIN — REMIFENTANIL HYDROCHLORIDE 0.08 MCG/KG/MIN: 1 INJECTION, POWDER, LYOPHILIZED, FOR SOLUTION INTRAVENOUS at 07:55

## 2017-12-18 ASSESSMENT — ACTIVITIES OF DAILY LIVING (ADL)
BATHING: 0-->INDEPENDENT
FALL_HISTORY_WITHIN_LAST_SIX_MONTHS: NO
TRANSFERRING: 0-->INDEPENDENT
COGNITION: 0 - NO COGNITION ISSUES REPORTED
RETIRED_COMMUNICATION: 0-->UNDERSTANDS/COMMUNICATES WITHOUT DIFFICULTY
DRESS: 0-->INDEPENDENT
AMBULATION: 0-->INDEPENDENT
SWALLOWING: 0-->SWALLOWS FOODS/LIQUIDS WITHOUT DIFFICULTY
TOILETING: 0-->INDEPENDENT
RETIRED_EATING: 0-->INDEPENDENT

## 2017-12-18 NOTE — BRIEF OP NOTE
[facility], [place]    Brief Operative Note    Pre-operative diagnosis: Lumbar 2,3 laminectomy and intradural tumor resection  Post-operative diagnosis Same  Procedure:   Lumbar 2-3 Laminectomy with Removal of Tumor  - Wound Class: I-Clean    Surgeon:      * Tash Cartagena MD - Primary     * Karson Chamberlain MD - Resident - Assisting  Anesthesia: General   Estimated blood loss: Less than 50 ml  Drains: None  Specimens:   ID Type Source Tests Collected by Time Destination   A : Intradural lumbar tumor Tissue Spine, Lumbar SURGICAL PATHOLOGY EXAM Tash Cartagena MD 12/18/2017 10:31 AM      Findings:   Tumor encompassing one of the nerve root. resected. Biecpes femoris sensory stimulation evident from the nerve for which it was not cut entirely..  Complications: None.  Implants: None.    Plan:  PACU --> 6A  Flat bedrest till chrissie AM  Cat in till then    Karson Chamberlain  PGY-7, Neurosurgery    Please dial * * * [phone] and enter job code [phone] to reach the neurosurgery team.

## 2017-12-18 NOTE — ANESTHESIA CARE TRANSFER NOTE
Patient: Sergio Cadena    Procedure(s):  Lumbar 2-3 Laminectomy with Removal of Tumor  - Wound Class: I-Clean   - Wound Class: I-Clean    Diagnosis: Spinal Meningioma   Diagnosis Additional Information: No value filed.    Anesthesia Type:   General     Note:  Airway :Face Mask  Patient transferred to:PACU  Comments: To PACU on supplemental O2 with + air exchange, placed on monitor. Report given to RN, questions answered, VSS, patient comfortable. Handoff Report: Identifed the Patient, Identified the Reponsible Provider, Reviewed the pertinent medical history, Discussed the surgical course, Reviewed Intra-OP anesthesia mangement and issues during anesthesia, Set expectations for post-procedure period and Allowed opportunity for questions and acknowledgement of understanding      Vitals: (Last set prior to Anesthesia Care Transfer)    CRNA VITALS  12/18/2017 1107 - 12/18/2017 1145      12/18/2017             Pulse: 97    SpO2: 95 %                Electronically Signed By: KAILEY Cuello CRNA  December 18, 2017  11:45 AM

## 2017-12-18 NOTE — IP AVS SNAPSHOT
[address] [address]    Phone:  [phone]                                       After Visit Summary   12/18/2017    Sergio Cadena    MRN: [id number]           After Visit Summary Signature Page     I have received my discharge instructions, and my questions have been answered. I have discussed any challenges I see with this plan with the nurse or doctor.    ..........................................................................................................................................  Patient/Patient Representative Signature      ..........................................................................................................................................  Patient Representative Print Name and Relationship to Patient    ..................................................               ................................................  Date                                            Time    ..........................................................................................................................................  Reviewed by Signature/Title    ...................................................              ..............................................  Date                                                            Time

## 2017-12-18 NOTE — ANESTHESIA POSTPROCEDURE EVALUATION
Patient: Sergio Cadena    Procedure(s):  Lumbar 2-3 Laminectomy with Removal of Tumor  - Wound Class: I-Clean   - Wound Class: I-Clean    Diagnosis:Spinal Meningioma   Diagnosis Additional Information: No value filed.    Anesthesia Type:  General    Note:  Anesthesia Post Evaluation    Patient location during evaluation: PACU  Patient participation: Able to fully participate in evaluation  Level of consciousness: awake and alert  Pain management: adequate  Airway patency: patent  Cardiovascular status: acceptable  Respiratory status: acceptable  Hydration status: acceptable  PONV: none             Last vitals:  Vitals:    12/18/17 0617 12/18/17 1145 12/18/17 1200   BP: (!) 137/97 114/71    Pulse: 71     Resp: 16 8 10   Temp: 36.6  C (97.8  F) 36.9  C (98.5  F)    SpO2: 97% 96% 97%         Electronically Signed By: Orlando Swanson MD  December 18, 2017  12:37 PM

## 2017-12-18 NOTE — PLAN OF CARE
Problem: Patient Care Overview  Goal: Plan of Care/Patient Progress Review  Outcome: No Change  Pt arrived from the PACU around 1330. POD#0 s/p L2-3 Laminectomy with Removal of Tumor. VSS, on 3L nasal cannula w/ sats in the mid 90's. A&Ox4. Neuros intact ex BLE weakness (4/5) d/t pain. Denies need for pain meds at this time, IV and PO meds available. Denies nausea. Back incision covered w/ primapore, small amount of serosang drainage. Pt to remain on flat bedrest for 24hours. OK to log roll and bend knees, no reverse trendelenburg. On clears, ADAT; has only had ice chips thus far. MIVF @ 75mL/hr thru PIV. Cat in place. Wife and daughter at bedside. Continue with POC.

## 2017-12-18 NOTE — OR NURSING
Pt arrived to PACU.  PACU Rn assumed care of pt @ 1145.  PT difficulty to arouse, minimal response with stimulation.  Right nasopharyngeal airway in place - pt breathing spontaneously - rate of 8-10.   Pupils PERRLA and pt wiggling toes bilat - unable to complete any further neuro exam until pt more alert.  Appears resting comfortably on exam.   .  Lungs equal and clear bilat; tolerating  Face mask.  Midline lumbar dressing c/d/i.  PT noted with redness from postioning on arrival - bilat upper chest chest and mid anterior thigh - areas blanchable - will cont to monitor skin status in these areas.  PT arrive supine HOB 0 - pt to continue on flat bedrest for 24 hours.  Cat  Intact.     See flowsheet.

## 2017-12-18 NOTE — ANESTHESIA PROCEDURE NOTES
Arterial Line Procedure Note  Staff:     Anesthesiologist:  CATHERINE BERGER  Location: In OR After Induction  Procedure Start/Stop Times:     patient identified, IV checked, site marked, risks and benefits discussed, informed consent, monitors and equipment checked, pre-op evaluation and at physician/surgeon's request      Correct Patient: Yes      Correct Position: Yes      Correct Site: Yes      Correct Procedure: Yes      Correct Laterality:  Yes    Site Marked:  Yes  Line Placement:     Procedure:  Arterial Line    Insertion Site:  Radial    Insertion laterality:  Left    Skin Prep: Chloraprep      Patient Prep: mask, sterile gloves, hat and hand hygiene      Local skin infiltration:  None    Ultrasound Guided?: Yes      Artery evaluated via ultrasound confirming patency.   Using realtime imaging, the artery was punctured and the needle was observed entering the artery.      A permanent image is entered into patient's chart.      Catheter size:  20 gauge, Quick cath    Dressing:  Tegaderm    Complications:  None obvious    Arterial waveform: Yes      IBP within 10% of NIBP: Yes

## 2017-12-18 NOTE — OR NURSING
Pt meeting phase one completion criteria @  1300.    Alert and oriented times 4.  neuro checks appear intact - no variation noted.   Rating pain     .   Lungs cont equal and clear bi lat.  Tolerating wean to 3 liters nasal cannula.    Midline lumbar dressing c/d/i.   Reddened areas on bi lat  upper chest chest, forehead and mid anterior thigh - areas remain  blanchable and decreasing in redness.  All skin intact in these areas.    Auto Held medications in EPIC EMAR were communicated to receiving unit RN.  SBAR Hand off report to Molly Henry  .  HOB remains flat - pt to continue on flat bedrest for 24 hours this given to RN in report.  Per Neuro surgery pt may have knees bent in for comfort as needed.   Cat  Remain  Intact - see I&O.  Neuro surgery resident to bedside at 1245 - to assess pt and complete neuro assessment.

## 2017-12-19 ENCOUNTER — APPOINTMENT (OUTPATIENT)
Dept: PHYSICAL THERAPY | Facility: CLINIC | Age: 47
DRG: 520 | End: 2017-12-19
Attending: STUDENT IN AN ORGANIZED HEALTH CARE EDUCATION/TRAINING PROGRAM
Payer: COMMERCIAL

## 2017-12-19 PROCEDURE — 25000128 H RX IP 250 OP 636: Performed by: STUDENT IN AN ORGANIZED HEALTH CARE EDUCATION/TRAINING PROGRAM

## 2017-12-19 PROCEDURE — 97530 THERAPEUTIC ACTIVITIES: CPT | Mod: GP

## 2017-12-19 PROCEDURE — 12000008 ZZH R&B INTERMEDIATE UMMC

## 2017-12-19 PROCEDURE — 25000132 ZZH RX MED GY IP 250 OP 250 PS 637: Performed by: STUDENT IN AN ORGANIZED HEALTH CARE EDUCATION/TRAINING PROGRAM

## 2017-12-19 PROCEDURE — 97116 GAIT TRAINING THERAPY: CPT | Mod: GP

## 2017-12-19 PROCEDURE — 40000193 ZZH STATISTIC PT WARD VISIT

## 2017-12-19 PROCEDURE — 97161 PT EVAL LOW COMPLEX 20 MIN: CPT | Mod: GP

## 2017-12-19 RX ORDER — LACTULOSE 10 G/15ML
20 SOLUTION ORAL 3 TIMES DAILY
Status: DISCONTINUED | OUTPATIENT
Start: 2017-12-19 | End: 2017-12-21 | Stop reason: HOSPADM

## 2017-12-19 RX ORDER — POLYETHYLENE GLYCOL 3350 17 G/17G
17 POWDER, FOR SOLUTION ORAL 3 TIMES DAILY
Status: DISCONTINUED | OUTPATIENT
Start: 2017-12-19 | End: 2017-12-21 | Stop reason: HOSPADM

## 2017-12-19 RX ADMIN — LISINOPRIL 40 MG: 40 TABLET ORAL at 20:27

## 2017-12-19 RX ADMIN — OXYCODONE HYDROCHLORIDE 10 MG: 5 TABLET ORAL at 12:24

## 2017-12-19 RX ADMIN — HYDROCHLOROTHIAZIDE 25 MG: 25 TABLET ORAL at 20:27

## 2017-12-19 RX ADMIN — OXYCODONE HYDROCHLORIDE 10 MG: 5 TABLET ORAL at 01:38

## 2017-12-19 RX ADMIN — ACETAMINOPHEN 975 MG: 325 TABLET, FILM COATED ORAL at 12:24

## 2017-12-19 RX ADMIN — CYCLOBENZAPRINE HYDROCHLORIDE 10 MG: 10 TABLET, FILM COATED ORAL at 09:05

## 2017-12-19 RX ADMIN — ACETAMINOPHEN 975 MG: 325 TABLET, FILM COATED ORAL at 20:27

## 2017-12-19 RX ADMIN — SODIUM CHLORIDE 1000 ML: 9 INJECTION, SOLUTION INTRAVENOUS at 22:00

## 2017-12-19 RX ADMIN — OXYCODONE HYDROCHLORIDE 10 MG: 5 TABLET ORAL at 16:46

## 2017-12-19 RX ADMIN — OXYCODONE HYDROCHLORIDE 10 MG: 5 TABLET ORAL at 20:27

## 2017-12-19 RX ADMIN — OXYCODONE HYDROCHLORIDE 10 MG: 5 TABLET ORAL at 05:59

## 2017-12-19 RX ADMIN — OMEPRAZOLE 20 MG: 20 CAPSULE, DELAYED RELEASE ORAL at 20:27

## 2017-12-19 RX ADMIN — OXYCODONE HYDROCHLORIDE 10 MG: 5 TABLET ORAL at 09:05

## 2017-12-19 RX ADMIN — CYCLOBENZAPRINE HYDROCHLORIDE 10 MG: 10 TABLET, FILM COATED ORAL at 15:13

## 2017-12-19 RX ADMIN — ACETAMINOPHEN 975 MG: 325 TABLET, FILM COATED ORAL at 04:21

## 2017-12-19 RX ADMIN — SODIUM CHLORIDE: 9 INJECTION, SOLUTION INTRAVENOUS at 01:39

## 2017-12-19 RX ADMIN — CYCLOBENZAPRINE HYDROCHLORIDE 10 MG: 10 TABLET, FILM COATED ORAL at 20:27

## 2017-12-19 ASSESSMENT — PAIN DESCRIPTION - DESCRIPTORS
DESCRIPTORS: ACHING
DESCRIPTORS: ACHING

## 2017-12-19 ASSESSMENT — VISUAL ACUITY
OU: NORMAL ACUITY

## 2017-12-19 NOTE — PROGRESS NOTES
" 12/19/17 1628   Quick Adds   Type of Visit Initial PT Evaluation   Living Environment   Lives With spouse   Living Arrangements house  (split-level, total of 4-level home)   Home Accessibility stairs within home;stairs to enter home   Number of Stairs to Enter Home 2   Number of Stairs Within Home (12 steps to each level)   Transportation Available car;family or friend will provide   Living Environment Comment Pt works full-time in [employer].  Wife able to be home for first week   Self-Care   Usual Activity Tolerance excellent   Current Activity Tolerance moderate   Regular Exercise yes   Activity/Exercise Type running/jogging   Equipment Currently Used at Home none   Functional Level Prior   Ambulation 0-->independent   Transferring 0-->independent   Toileting 0-->independent   Bathing 0-->independent   Dressing 0-->independent   Eating 0-->independent   Communication 0-->understands/communicates without difficulty   Swallowing 0-->swallows foods/liquids without difficulty   Cognition 0 - no cognition issues reported   Fall history within last six months no   General Information   Onset of Illness/Injury or Date of Surgery - Date 12/18/17   Referring Physician Rosalie Gaytan MD   Patient/Family Goals Statement \"Be able to return to work.  I don't know how long to expect for me to be less stiff and painful\"   Pertinent History of Current Problem (include personal factors and/or comorbidities that impact the POC) 46yo M s/p Lumbar 2-3 midline laminectomy with intradural tumor resection.  PMH:  esophageal reflux   Precautions/Limitations spinal precautions;fall precautions   General Observations Wife engaged at bedside and very attentive to cares   General Info Comments 2LPM O2 via NC at rest.  Positive for orthostatic hypotension earlier this date with RN staff, negative on PT exam see vitals flowsheet   Cognitive Status Examination   Orientation orientation to person, place and time   Level of " Consciousness alert   Follows Commands and Answers Questions 100% of the time   Personal Safety and Judgment intact   Memory intact   Pain Assessment   Patient Currently in Pain Yes, see Vital Sign flowsheet   Integumentary/Edema   Integumentary/Edema no deficits were identifed   Posture    Posture Not impaired   Range of Motion (ROM)   ROM Comment WFL BLE   Strength   Strength Comments 5/5 BUE.  >3/5 BLE no areas of focal deficit noted.  Selective testing of L hamstrings in future session is indicated 2/2 nerve root manipulation described in Operative note by Karson Chamberlain MD.  Pt repotrs generalized weakness 2/2 poor sleep and PO  intake in last 2 days   Bed Mobility   Bed Mobility Comments Rolls mod I with bedrail.  Supine<>sit min A   Transfer Skills   Transfer Comments sit<>stand CGA to FWW   Gait   Gait Comments uses FWW.  Demo's short steps B, footflat at BIC's, decreased speed.  generally good trunk control   Balance   Balance Comments Static standing is very good.  Dynamic balance is adequate for functional reaching in bathroom   Sensory Examination   Sensory Perception Comments mild decreased light touch sensation at L L2 dermatome   Coordination   Coordination no deficits were identified   Muscle Tone   Muscle Tone no deficits were identified   General Therapy Interventions   Planned Therapy Interventions bed mobility training;gait training;transfer training;home program guidelines;progressive activity/exercise   Clinical Impression   Criteria for Skilled Therapeutic Intervention yes, treatment indicated   PT Diagnosis impaired functional mobility   Influenced by the following impairments sensory deficits, pain, mild generalized weakness   Functional limitations due to impairments decreased (I) bed mobility, transfers, gait   Clinical Presentation Stable/Uncomplicated   Clinical Presentation Rationale vitals and labs WNL.  On room air.  No post-op complications.   Clinical Decision Making (Complexity) Low  "complexity   Therapy Frequency` daily   Predicted Duration of Therapy Intervention (days/wks) 4 days   Anticipated Discharge Disposition Home with Assist   Risk & Benefits of therapy have been explained Yes   Patient, Family & other staff in agreement with plan of care Yes   Clinical Impression Comments Pt will benefit from skilled PT to maximize (I) functional mobility   [facility] AM-PAC  \"6 Clicks\" V.2 Basic Mobility Inpatient Short Form   1. Turning from your back to your side while in a flat bed without using bedrails? 4 - None   2. Moving from lying on your back to sitting on the side of a flat bed without using bedrails? 3 - A Little   3. Moving to and from a bed to a chair (including a wheelchair)? 4 - None   4. Standing up from a chair using your arms (e.g., wheelchair, or bedside chair)? 3 - A Little   5. To walk in hospital room? 4 - None   6. Climbing 3-5 steps with a railing? 3 - A Little   Basic Mobility Raw Score (Score out of 24.Lower scores equate to lower levels of function) 21   Total Evaluation Time   Total Evaluation Time (Minutes) 10     "

## 2017-12-19 NOTE — PLAN OF CARE
Problem: Patient Care Overview  Goal: Plan of Care/Patient Progress Review  6A:  Acknowledge PT order and liberalization of bedrest orders.  Per RN pt symptomatic with orthostatic hypotension when attempting to stand with RN staff earlier this afternoon.  PT will reassess pt later PM today to see if BP's improved and able to participate in OOB eval.  If not, will assess AM 12/20

## 2017-12-19 NOTE — PLAN OF CARE
Problem: Patient Care Overview  Goal: Plan of Care/Patient Progress Review  Outcome: Improving  Patient POD#1 L2-3 Lami with tumor removal. Flat BR tonight. Turns side to side. Incisional dressing marked with small drainage and no increase in drainage. Good pain control with oxycodone. Patient stated his pre surgical pain in lt leg is gone. Strength 4-5/5 Cat in place due to bedrest status. Tolerating regular diet and denies nausea. Vitals stable. But needing 3L per NC to keep sat's in mid 90's. IS at bedside and patient useing it with encouragement. Continue to monitor and treat per plan of care.

## 2017-12-19 NOTE — PLAN OF CARE
Problem: Patient Care Overview  Goal: Plan of Care/Patient Progress Review  6A:  Eval completed and treatment initiated.     Discharge Planner PT   Patient plan for discharge: Home with assist from wife  Current status: BP's stable in later PM, negative for orthostatics - see vitals flowsheet for data.  Mobilizing OOB with min A, ambulated x300ft around unit with FWW and SBA.  Subjective and objective improvement in LLE radicular symptoms.  Barriers to return to prior living situation: Pain, low back stiffness  Recommendations for discharge: Home with assist from wife  Rationale for recommendations: Pt with few mobility deficits POD1, anticipate will progress quickly with continued ambulation in halls with RN and good participation in therapy sessions tomorrow       Entered by: Lavern York 12/19/2017 4:41 PM

## 2017-12-19 NOTE — PLAN OF CARE
Problem: Patient Care Overview  Goal: Plan of Care/Patient Progress Review  Outcome: No Change  POD#1 s/p L3-5 lami w/ tumor resection. VSS on 2L O2 via nasal cannula, encouraged pt to use IS. A&Ox4. Neuros intact. Pain controlled w/ scheduled tylenol, flexeril, and prn oxy q3hrs. Back incision covered w/ primapore, drainage marked-no change. Pt on flat bedrest until about 1200 today. Tolerated slowly raising HOB. Pt did get up and stand w/ A1. Felt dizzy and lightheaded afterwards, BP hypotensive 80s/50s which has since resolved. Tolerating regular diet. PIV KRISHNA Cat removed at 1pm today. Declined bowel meds, passing gas, +BS. Therapies have yet to see pt. Wife at bedside. Continue with POC.

## 2017-12-19 NOTE — PROGRESS NOTES
Care Coordinator Progress Note     Admission Date/Time:  12/18/2017  Attending MD:  Tash Cartagena MD     Data  Chart reviewed, discussed with interdisciplinary team.   Patient was admitted for: Data Unavailable.    Concerns with insurance coverage for discharge needs: None.  Current Living Situation: Patient lives with spouse.  Support System: Supportive and Involved  Services Involved: None prior to discharge  Transportation: Family or Friend will provide  Barriers to Discharge: Medical plan of care    Coordination of Care and Referrals: Provided patient/family with options for Home Care and Outpatient Rehab.        Assessment    I met with the patient and his wife to introduce the role of the care coordinator and to assist with discharge planning. The patient was unable to participate in therapy today due to orthostatic hypotension. Informed patient that we will await therapy recs and assist with home PT/OT or OP PT/OT if recommended. The patient is amenable to either option and states that [facility] is close to where he lives.    The patient also states that he lives with his wife, who can provide transportation and his 17 year old son. He will be off work for 6 weeks and asked for assistance with [facility] paperwork. RN CC paged Marcy Whitfield NP, she is already aware of the patient's request and will complete his paperwork prior to discharge.    RN CC will continue to follow and assist with DC planning as needed.     Plan  Anticipated Discharge Date:  12/20/17  Anticipated Discharge Plan:  Home with family assist, home therapy vs. OP therapy    Antonai Fatima RN CC for Alexandrea GalvanWILLIAMS Care Coordinator    Ph:[phone]

## 2017-12-19 NOTE — OP NOTE
DATE OF PROCEDURE:  12/18/2017.        PREOPERATIVE DIAGNOSIS:  Lumbar intradural mass.      POSTOPERATIVE DIAGNOSIS:  Lumbar intradural mass.      PROCEDURES PERFORMED:     1.  Lumbar 2-3 midline laminectomy.   2.  Lumbar intradural tumor resection.     3.  Intraoperative neuro-monitoring: SSEP, EMG lower extremities and rectal EMG  4.  Use of intraoperative microscopy.   5.  Use of intraoperative ultrasound.    6.  Use of intraoperative x-rays.      SURGEON:  Tash Cartagena MD.      ASSISTANT:  Karson Chamberlain MD.      ANESTHESIA:  General endotracheal anesthesia.      POSITION:  Prone on a regular bed on a Ludwin frame.     INDICATION FOR PROCEDURE:  Mr. Sergio Cadena is a 47-year-old gentleman who presented to the Neurosurgery Clinic in 06/2017 with concerns of leg pain, left lateral mid thigh pain.  He had this going on for 3 years without any treatment benefit.  On imaging of his lumbar spine, he was found to have no disk herniation or any foraminal stenosis but a 5 mm enhancing lesion at the L2-L3 level, encompassing one of the nerve roots.  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  For diagnosis as well as evaluation if this could be part of his symptoms, resection was recommended.  It was discussed in detail with him that resection of this may not benefit with his radicular symptoms but it is essential for diagnostic purposes.  He gave informed written consent for the same after understanding the risks, benefits and alternatives for the procedure in detail.      PROCEDURE Mr. Cadena was brought to the operating room by our anesthesia colleagues.  He underwent general endotracheal anesthesia and a Cat catheter and arterial line were placed.  Neuro monitoring wires were placed and he was then positioned prone on a Ludwin frame and all the pressure points were appropriately padded.  Perioperative antibiotic was given and the midline lumbar spine was marked from L2 to L3 spinous  processes using intraoperative x-rays.  After sterile prepping and draping was completed, and timeout was performed.       Local anesthetic was injected a 10 blade was used to open the planned incision and dissection performed with the help of monopolar cautery to expose the spinous process and lamina of L2 and L3 bilaterally in a subperiosteal plane.  Retractors were placed and x-ray was taken to confirm the level.  Using a combination of Leksell and Kerrison punches, L2 and L3 laminectomy performed and the ligamentum flavum was removed.  Once the dura was exposed, it was opened with the help of 15 blade under microscopic guidance.  Then, dural tack-up stitches were placed on either side.    Inside the dura, the white filum terminale along with lumbo-sacral nerve roots were evident. One of the nerve roots was seen to be abnormal where the tumor was seen to be encompassing it.  Intraoperative nerve stimulator was used to evaluate any obvious nerve root involvement.  It was seen that where the tumor was compressing the nerve root, there was some stimulation of the biceps femorus muscle, but this was at very high stimulation.  At this point, microdissection was performed to remove the tumor from along the nerve root edge. Ater this resection, stimulation was once again performed that confirmed stable stimulation as prior to resection. The tumor was sent for final pathological assessment.    Irrigation was then performed in the intradural space and the dura was closed with 4-0 Nurolon stitches in a running continuous watertight manner.  A Valsalva maneuver was performed that did not show any obvious CSF leak. Duraseal was sprayed over the closure.  Then, the paraspinal muscle was brought together with the help of 0 Vicryl stitches in an interrupted manner.  The fascia was brought together with the help of 0 Vicryl stitches in an interrupted watertight manner.  The subcutaneous tissue was closed with 2-0 Vicryl in an  inverted interrupted manner and the skin itself was closed with 4-0 Monocryl, 4-0 Monocryl in a running subcuticular manner.  The needle and sponge count was correct at the end of procedure.      Neuro monitoring was stable otherwise during the whole procedure.  The Dr. Elpidio Chua was present and scrubbed in for the critical portion of the procedure and immediately available for the rest of it.         ELPIDIO CHUA MD       As dictated by CRISTOBAL FLOWERS MD            D: 2017 17:00   T: 2017 19:07   MT:       Name:     CARRIE CARTY   MRN:      [id number]        Account:        [id number]   :      1970           Procedure Date: 2017      Document: [id number]

## 2017-12-19 NOTE — PROGRESS NOTES
[facility], [place]   Neurosurgery Progress Note:    Assessment: Sergio Cadena is a 47 year old male postoperative day # 1 from lumbar 2-3 laminectomy for intradural tumor resection.     Plan:  - Serial neuro exams  - Pain control  - flat bed rest until this afternoon  - Advance diet as tolerates  - Bowel regimen  - PRN antiemetics  - IVF until taking adequate PO  - Keep rodriguez until off of bedrest   - PTA lisinopril and omeprazole   - PT/OT after bedrest relaxed  - SCDs for DVT ppx  - Dispo: 6A, anticipate discharge 12/20 pending therapy evaluations and recommendations.    Discussed with Neurosurgery chief, who agrees.    Interval History: no acute events overnight.  Pain controlled.        Objective:   Temp:  [96.9  F (36.1  C)-98.7  F (37.1  C)] 97.5  F (36.4  C)  Pulse:  [95] 95  Heart Rate:  [] 79  Resp:  [8-19] 19  BP: (107-133)/(44-72) 113/60  MAP:  [65 mmHg-71 mmHg] 68 mmHg  Arterial Line BP: (102-112)/(45-54) 105/51  SpO2:  [92 %-97 %] 94 %  I/O last 3 completed shifts:  In: 2118.75 [P.O.:200; I.V.:1668.75]  Out: 2770 [Urine:2720; Blood:50]    Gen: sleepy in bed in no acute distress  Wound: not evaluated   Neurologic:  - Alert & Oriented to person, place, time, and situation  - Follows commands briskly  - Speech fluent, spontaneous. No aphasia or dysarthria.  - No gaze preference. No apparent hemineglect.  - PERRL, EOMI  - Face symmetric      Del Tr Bi WE WF Gr   R 5 5 5 5 5 5   L 5 5 5 5 5 5    HF KE KF DF PF EHL   R 5 5 5 5 5 5   L 5 5 5 5 5 5     Reflexes 2+ throughout  Sensation intact and symmetric to light touch throughout    LABS  Reviewed     IMAGING    Reviewed     Please contact neurosurgery resident on call with questions.    Dial * * [phone], enter [phone] when prompted.

## 2017-12-19 NOTE — PLAN OF CARE
Problem: Patient Care Overview  Goal: Plan of Care/Patient Progress Review  Pt VSS. POD#0 s/p L2-3 laminectomy with intradural tumor resection. On capno, WDL, sats in mid 90s on 2L. Flat bedrest for 24hrs. Cat in place. MIVF at 75ml/hr. Neuros intact. Reg diet, tolerating well. Pain controlled with oxy/tylenol. Family at bedside most of shift and very supportive of pt. Bed locked out, BA on. Calls appropriately. SCDs on. Pt using IS often. Cont with POC.

## 2017-12-20 ENCOUNTER — APPOINTMENT (OUTPATIENT)
Dept: GENERAL RADIOLOGY | Facility: CLINIC | Age: 47
DRG: 520 | End: 2017-12-20
Attending: NURSE PRACTITIONER
Payer: COMMERCIAL

## 2017-12-20 ENCOUNTER — APPOINTMENT (OUTPATIENT)
Dept: PHYSICAL THERAPY | Facility: CLINIC | Age: 47
DRG: 520 | End: 2017-12-20
Attending: NEUROLOGICAL SURGERY
Payer: COMMERCIAL

## 2017-12-20 LAB
ALBUMIN UR-MCNC: 10 MG/DL
ANION GAP SERPL CALCULATED.3IONS-SCNC: 6 MMOL/L (ref 3–14)
APPEARANCE UR: CLEAR
BILIRUB UR QL STRIP: NEGATIVE
BUN SERPL-MCNC: 17 MG/DL (ref 7–30)
CALCIUM SERPL-MCNC: 8.9 MG/DL (ref 8.5–10.1)
CHLORIDE SERPL-SCNC: 102 MMOL/L (ref 94–109)
CO2 SERPL-SCNC: 32 MMOL/L (ref 20–32)
COLOR UR AUTO: YELLOW
COPATH REPORT: NORMAL
CREAT SERPL-MCNC: 0.88 MG/DL (ref 0.66–1.25)
GFR SERPL CREATININE-BSD FRML MDRD: >90 ML/MIN/1.7M2
GLUCOSE SERPL-MCNC: 117 MG/DL (ref 70–99)
GLUCOSE UR STRIP-MCNC: NEGATIVE MG/DL
HGB UR QL STRIP: NEGATIVE
KETONES UR STRIP-MCNC: 80 MG/DL
LEUKOCYTE ESTERASE UR QL STRIP: NEGATIVE
MAGNESIUM SERPL-MCNC: 2.3 MG/DL (ref 1.6–2.3)
MUCOUS THREADS #/AREA URNS LPF: PRESENT /LPF
NITRATE UR QL: NEGATIVE
PH UR STRIP: 6.5 PH (ref 5–7)
POTASSIUM SERPL-SCNC: 3.8 MMOL/L (ref 3.4–5.3)
RBC #/AREA URNS AUTO: <1 /HPF (ref 0–2)
SODIUM SERPL-SCNC: 140 MMOL/L (ref 133–144)
SOURCE: ABNORMAL
SP GR UR STRIP: 1.02 (ref 1–1.03)
UROBILINOGEN UR STRIP-MCNC: NORMAL MG/DL (ref 0–2)
WBC #/AREA URNS AUTO: 1 /HPF (ref 0–2)

## 2017-12-20 PROCEDURE — 25000128 H RX IP 250 OP 636: Performed by: STUDENT IN AN ORGANIZED HEALTH CARE EDUCATION/TRAINING PROGRAM

## 2017-12-20 PROCEDURE — 97112 NEUROMUSCULAR REEDUCATION: CPT | Mod: GP

## 2017-12-20 PROCEDURE — 83735 ASSAY OF MAGNESIUM: CPT | Performed by: STUDENT IN AN ORGANIZED HEALTH CARE EDUCATION/TRAINING PROGRAM

## 2017-12-20 PROCEDURE — 25000132 ZZH RX MED GY IP 250 OP 250 PS 637: Performed by: STUDENT IN AN ORGANIZED HEALTH CARE EDUCATION/TRAINING PROGRAM

## 2017-12-20 PROCEDURE — 97530 THERAPEUTIC ACTIVITIES: CPT | Mod: GP

## 2017-12-20 PROCEDURE — 12000001 ZZH R&B MED SURG/OB UMMC

## 2017-12-20 PROCEDURE — 81001 URINALYSIS AUTO W/SCOPE: CPT | Performed by: STUDENT IN AN ORGANIZED HEALTH CARE EDUCATION/TRAINING PROGRAM

## 2017-12-20 PROCEDURE — 74000 XR ABDOMEN 1 VW: CPT

## 2017-12-20 PROCEDURE — 80048 BASIC METABOLIC PNL TOTAL CA: CPT | Performed by: STUDENT IN AN ORGANIZED HEALTH CARE EDUCATION/TRAINING PROGRAM

## 2017-12-20 PROCEDURE — 25000128 H RX IP 250 OP 636: Performed by: NURSE PRACTITIONER

## 2017-12-20 PROCEDURE — 25000132 ZZH RX MED GY IP 250 OP 250 PS 637: Performed by: NURSE PRACTITIONER

## 2017-12-20 PROCEDURE — 36415 COLL VENOUS BLD VENIPUNCTURE: CPT | Performed by: STUDENT IN AN ORGANIZED HEALTH CARE EDUCATION/TRAINING PROGRAM

## 2017-12-20 PROCEDURE — 97116 GAIT TRAINING THERAPY: CPT | Mod: GP

## 2017-12-20 PROCEDURE — 40000193 ZZH STATISTIC PT WARD VISIT

## 2017-12-20 RX ORDER — MAGNESIUM CARB/ALUMINUM HYDROX 105-160MG
296 TABLET,CHEWABLE ORAL ONCE
Status: COMPLETED | OUTPATIENT
Start: 2017-12-20 | End: 2017-12-20

## 2017-12-20 RX ORDER — OXYCODONE HYDROCHLORIDE 5 MG/1
5-10 TABLET ORAL
Qty: 30 TABLET | Refills: 0 | Status: SHIPPED | OUTPATIENT
Start: 2017-12-20 | End: 2018-01-03

## 2017-12-20 RX ORDER — AMOXICILLIN 250 MG
1-2 CAPSULE ORAL 2 TIMES DAILY
Qty: 30 TABLET | Refills: 0 | Status: SHIPPED | OUTPATIENT
Start: 2017-12-20 | End: 2018-04-19

## 2017-12-20 RX ORDER — BISACODYL 10 MG
10 SUPPOSITORY, RECTAL RECTAL DAILY PRN
Status: DISCONTINUED | OUTPATIENT
Start: 2017-12-20 | End: 2017-12-21 | Stop reason: HOSPADM

## 2017-12-20 RX ORDER — CYCLOBENZAPRINE HCL 10 MG
10 TABLET ORAL 3 TIMES DAILY
Qty: 30 TABLET | Refills: 0 | Status: SHIPPED | OUTPATIENT
Start: 2017-12-20 | End: 2018-04-19

## 2017-12-20 RX ORDER — MAGNESIUM CARB/ALUMINUM HYDROX 105-160MG
45 TABLET,CHEWABLE ORAL DAILY PRN
Status: DISCONTINUED | OUTPATIENT
Start: 2017-12-20 | End: 2017-12-21 | Stop reason: HOSPADM

## 2017-12-20 RX ORDER — POLYETHYLENE GLYCOL 3350 17 G/17G
17 POWDER, FOR SOLUTION ORAL 3 TIMES DAILY
Qty: 7 PACKET | Refills: 1 | Status: SHIPPED | OUTPATIENT
Start: 2017-12-20 | End: 2018-04-19

## 2017-12-20 RX ORDER — SODIUM CHLORIDE 9 MG/ML
INJECTION, SOLUTION INTRAVENOUS CONTINUOUS
Status: DISCONTINUED | OUTPATIENT
Start: 2017-12-20 | End: 2017-12-21 | Stop reason: HOSPADM

## 2017-12-20 RX ORDER — LACTULOSE 10 G/15ML
20 SOLUTION ORAL 3 TIMES DAILY
Qty: 236 ML | Refills: 0 | Status: SHIPPED | OUTPATIENT
Start: 2017-12-20 | End: 2018-04-19

## 2017-12-20 RX ADMIN — LACTULOSE 20 G: 20 SOLUTION ORAL at 14:06

## 2017-12-20 RX ADMIN — SODIUM CHLORIDE: 9 INJECTION, SOLUTION INTRAVENOUS at 18:34

## 2017-12-20 RX ADMIN — HYDROCHLOROTHIAZIDE 25 MG: 25 TABLET ORAL at 21:58

## 2017-12-20 RX ADMIN — SENNOSIDES AND DOCUSATE SODIUM 2 TABLET: 8.6; 5 TABLET ORAL at 08:44

## 2017-12-20 RX ADMIN — CYCLOBENZAPRINE HYDROCHLORIDE 10 MG: 10 TABLET, FILM COATED ORAL at 08:44

## 2017-12-20 RX ADMIN — OXYCODONE HYDROCHLORIDE 5 MG: 5 TABLET ORAL at 08:44

## 2017-12-20 RX ADMIN — OXYCODONE HYDROCHLORIDE 5 MG: 5 TABLET ORAL at 04:34

## 2017-12-20 RX ADMIN — LACTULOSE 20 G: 20 SOLUTION ORAL at 21:58

## 2017-12-20 RX ADMIN — ACETAMINOPHEN 975 MG: 325 TABLET, FILM COATED ORAL at 21:57

## 2017-12-20 RX ADMIN — POLYETHYLENE GLYCOL 3350 17 G: 17 POWDER, FOR SOLUTION ORAL at 08:45

## 2017-12-20 RX ADMIN — POLYETHYLENE GLYCOL 3350 17 G: 17 POWDER, FOR SOLUTION ORAL at 14:06

## 2017-12-20 RX ADMIN — MAGNESIUM CITRATE 296 ML: 1.75 LIQUID ORAL at 11:11

## 2017-12-20 RX ADMIN — ACETAMINOPHEN 975 MG: 325 TABLET, FILM COATED ORAL at 04:34

## 2017-12-20 RX ADMIN — OXYCODONE HYDROCHLORIDE 5 MG: 5 TABLET ORAL at 01:49

## 2017-12-20 RX ADMIN — POLYETHYLENE GLYCOL 3350 17 G: 17 POWDER, FOR SOLUTION ORAL at 21:58

## 2017-12-20 RX ADMIN — LISINOPRIL 40 MG: 40 TABLET ORAL at 21:58

## 2017-12-20 RX ADMIN — CYCLOBENZAPRINE HYDROCHLORIDE 10 MG: 10 TABLET, FILM COATED ORAL at 14:06

## 2017-12-20 RX ADMIN — SENNOSIDES AND DOCUSATE SODIUM 2 TABLET: 8.6; 5 TABLET ORAL at 21:58

## 2017-12-20 RX ADMIN — ONDANSETRON 4 MG: 2 INJECTION INTRAMUSCULAR; INTRAVENOUS at 19:17

## 2017-12-20 RX ADMIN — ACETAMINOPHEN 975 MG: 325 TABLET, FILM COATED ORAL at 12:25

## 2017-12-20 RX ADMIN — MINERAL OIL 45 ML: 99.9 LIQUID ORAL; TOPICAL at 20:25

## 2017-12-20 RX ADMIN — CYCLOBENZAPRINE HYDROCHLORIDE 10 MG: 10 TABLET, FILM COATED ORAL at 21:57

## 2017-12-20 ASSESSMENT — VISUAL ACUITY
OU: NORMAL ACUITY

## 2017-12-20 NOTE — PLAN OF CARE
Problem: Patient Care Overview  Goal: Plan of Care/Patient Progress Review  Discharge Planner PT   Patient plan for discharge: Home with assist and OP Physical Therapy  Current status:  Pt performs sit <> stand within spinal precautions with modified independence due to heavy UE use. Pt able to utilize bathroom independently with FWW for stability. Pt ambulated with supervision/independent with use of FWW 1 x ~200 ft . Pt ambulated 1 x ~400 ft without assistive device and contact guard/supervision to improve tolerance for safe ambulation with AD. Pt tolerated ambulation well. Although Pt ambulated without AD, Pt would benefit from FWW to improve stability with ambulation to safely discharge home. Pt performed 3 x 3 stairs with unilateral railing and step to approach with steven UE use with therapist supervision. Pt performed standing balance exercise to improve balance with narrow stance. Pt returned to room to attempt BM and Pt's nurse notified.   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       Entered by: Sydney Spain 12/20/2017 10:52 AM

## 2017-12-20 NOTE — PLAN OF CARE
Problem: Patient Care Overview  Goal: Plan of Care/Patient Progress Review  Outcome: No Change  VSS. A&Ox4. Neuros intact. Incisional pain controlled w/ prn oxy and scheduled Tylenol and flexeril. Voiding spont. No BM yet--pt refuses pink lady enema but has taken miralax, senna, mag citrate, and lactulose. Up SBA w/ walker. Getting up independently to the bathroom. Regular diet. PIV x2 SL. Pt had emesis x1 this shift. Will dc home once pt has BM. Continue with POC.  Addendum: abdomen xray ordered for pt. Will continue to monitor.

## 2017-12-20 NOTE — PROGRESS NOTES
[facility], [place]   Neurosurgery Progress Note:    Assessment: Sergio Cadena is a 47 year old male postoperative day # 2 from lumbar 2-3 laminectomy for intradural tumor resection.     Plan:  - Serial neuro exams  - Pain control  - Regular diet.   - Bowel regimen  - PRN antiemetics  - IVF until taking adequate PO  - PTA lisinopril and omeprazole   - PT/OT: HWA  - SCDs for DVT ppx  - Dispo: 6A, anticipate discharge 12/20 pending BM.    Discussed with Neurosurgery chief, who agrees.    Interval History: up walking around the unit yesterday.  No BM yet though passing gas.       Objective:   Temp:  [96.2  F (35.7  C)-98.8  F (37.1  C)] 97.8  F (36.6  C)  Heart Rate:  [] 86  Resp:  [16-20] 16  BP: ()/(51-70) 116/70  SpO2:  [92 %-97 %] 92 %  I/O last 3 completed shifts:  In: 1401.25 [P.O.:200; I.V.:1201.25]  Out: 2200 [Urine:2200]    Gen: sleepy in bed in no acute distress  Wound: lumbar incision c/d/i with sutures in place, no significant erythema, swelling or drainage; no evidence of CSF leak   Neurologic:  - Alert & Oriented to person, place, time, and situation  - Follows commands briskly  - Speech fluent, spontaneous. No aphasia or dysarthria.  - No gaze preference. No apparent hemineglect.  - PERRL, EOMI  - Face symmetric      Del Tr Bi WE WF Gr   R 5 5 5 5 5 5   L 5 5 5 5 5 5    HF KE KF DF PF EHL   R 5 5 5 5 5 5   L 5 5 5 5 5 5     Reflexes 2+ throughout  Sensation intact and symmetric to light touch throughout  Abd: soft, non-tender, mildly distended     LABS  Reviewed     IMAGING    Reviewed     Please contact neurosurgery resident on call with questions.    Dial * * [phone], enter [phone] when prompted.

## 2017-12-20 NOTE — PLAN OF CARE
Problem: Patient Care Overview  Goal: Plan of Care/Patient Progress Review  OT 6A: Defer. Per discussion with PT, patient independent with ADLs. Patient with no inpatient OT needs at this time, OT to complete orders. PT to follow for safe discharge planning.

## 2017-12-20 NOTE — PLAN OF CARE
Problem: Patient Care Overview  Goal: Plan of Care/Patient Progress Review  Outcome: No Change  POD #1 L2-L3 laminectomy for tumor resection. VS include slight hypotension, MDs aware, bolus fluids ordered. On RA with sats in high 90's. A&Ox4. Neuros intact. Pt reports back pain that is relieved with PRN oxy and scheduled tylenol and flexeril. Pt has yet to void since rodriguez was removed at 1300, bladder scan showed 40ml and 150ml, MDs aware. Pt not drinking much fluids, encouraged to drink more. Worked with PT this evening. Up with SBA and walker. On regular diet. Back primapore in place with no expansion. Uses call light appropriately. Will continue to monitor and follow POC.

## 2017-12-21 VITALS
RESPIRATION RATE: 16 BRPM | HEIGHT: 74 IN | DIASTOLIC BLOOD PRESSURE: 78 MMHG | WEIGHT: 257.72 LBS | OXYGEN SATURATION: 92 % | HEART RATE: 88 BPM | BODY MASS INDEX: 33.07 KG/M2 | SYSTOLIC BLOOD PRESSURE: 134 MMHG | TEMPERATURE: 98 F

## 2017-12-21 PROCEDURE — 25000132 ZZH RX MED GY IP 250 OP 250 PS 637: Performed by: NEUROLOGICAL SURGERY

## 2017-12-21 PROCEDURE — 25000132 ZZH RX MED GY IP 250 OP 250 PS 637: Performed by: STUDENT IN AN ORGANIZED HEALTH CARE EDUCATION/TRAINING PROGRAM

## 2017-12-21 RX ORDER — MAGNESIUM CARB/ALUMINUM HYDROX 105-160MG
45 TABLET,CHEWABLE ORAL DAILY PRN
Status: DISCONTINUED | OUTPATIENT
Start: 2017-12-21 | End: 2017-12-21

## 2017-12-21 RX ADMIN — POLYETHYLENE GLYCOL 3350 17 G: 17 POWDER, FOR SOLUTION ORAL at 11:23

## 2017-12-21 RX ADMIN — CYCLOBENZAPRINE HYDROCHLORIDE 10 MG: 10 TABLET, FILM COATED ORAL at 10:05

## 2017-12-21 RX ADMIN — ACETAMINOPHEN 975 MG: 325 TABLET, FILM COATED ORAL at 05:25

## 2017-12-21 RX ADMIN — DOCUSATE SODIUM 286 ML: 50 LIQUID ORAL at 08:36

## 2017-12-21 RX ADMIN — SENNOSIDES AND DOCUSATE SODIUM 2 TABLET: 8.6; 5 TABLET ORAL at 11:23

## 2017-12-21 RX ADMIN — ACETAMINOPHEN 975 MG: 325 TABLET, FILM COATED ORAL at 11:31

## 2017-12-21 ASSESSMENT — VISUAL ACUITY
OU: NORMAL ACUITY
OU: NORMAL ACUITY

## 2017-12-21 NOTE — PLAN OF CARE
Problem: Patient Care Overview  Goal: Plan of Care/Patient Progress Review  Physical Therapy Discharge Summary    Reason for therapy discharge:    Discharged to home.    Progress towards therapy goal(s). See goals on Care Plan in [facility] electronic health record for goal details.  Goals met    Therapy recommendation(s):    No further therapy is recommended.

## 2017-12-21 NOTE — DISCHARGE SUMMARY
[facility] Discharge Summary and Instructions    Sergio Cadena MRN# [id number]   Age: 47 year old YOB: 1970     Date of Admission:  12/18/2017  Date of Discharge::  12/21/2017  Admitting Physician:  Tash Cartagena MD  Discharge Physician:  Tash Cartagena MD          Admission Diagnoses:   Intradural tumor [D49.7]          Discharge Diagnosis:   Intradural tumor [D49.7]          Procedures:   12/18/2017 - L2-L3 Laminectomy with Intradural Tumor Resection            Brief History of Illness:   Mr. Cadena is a very pleasant 47 year old male whose past medical history is significant for Esophageal Stricture and Stenosis. He was referred to Dr. Tash Cartagena for evaluation of Low Back Pain and surgical evaluation of an L2/L3 Intradural Extramedullary Mass that had been identified on an MRI Lumbar Spine that was obtained as part of the patient's work-up for his low back pain. The patient was seen in the Neurosurgery Clinic for consultation on 6/29/2017. At that time, the patient reported a 3 year history of progressive low back pain. He also endorsed Left Lower Extremity Radicular Pain. The patient's pain began to interfere with his work as a . He had participated in a course of conservative treatment including an L2 Epidural Steroid Injection in May 2017 and use of Non-Steroidal Anti-Inflammatory Medications. Upon examination, the patient had no focal motor weakness. His MRI Lumbar Spine which was obtained on 5/15/2017 was reviewed and demonstrated a 5 mm enhancing mass at L2/L3 below the level of the L2 nerve root. He was seen in the Neurosurgery Clinic for follow-up on 8/24/2017 after undergoing work-up with an EMG of the LLE which did not demonstrate any abnormality. The patient was advised that the mass was not likely the cause of his left leg pain. He was also advised that if he so chooses, a watchful waiting approach, rather than surgical resection of the mass,  "would be appropriate since this was likely a slow-growing and benign mass. At that time the patient expressed that he would like to have the tumor resected and was hopeful that the resection would alleviate some, if not all, of his left leg pain.            Hospital Course:   The patient was admitted to the hospital on 12/18/2017 and underwent the above named operation. There there no zak-operative complications. Post-operatively, the patient was transferred to Unit 6A for routine post-operative cares. The surgical pathology was consistent with a Schwannoma (WHO Grade 1). The patient's post-operative course was notable for constipation for which he received oral stimulants as well as an enema. He was evaluated with an Abdominal X-Ray that demonstrated dilation of the transverse and descending colon. He had 2 small bowel movements on 12/20/17 and 1 large bowel movement on 12/21/2017. He was started on a clear liquid diet which he tolerated well, and was transitioned to a regular diet. He reported no nausea, vomiting, or abdominal pain after eating. On 12/21/2017, the patient had met all of his discharge criteria in that his post-operative pain was well-controlled with oral analgesics, he was was ambulating, voiding and having a bowel movement, and was tolerating a regular diet. He was deemed medically and neurologically stable for discharge home.     /78 (BP Location: Left arm)  Pulse 88  Temp 98  F (36.7  C) (Oral)  Resp 16  Ht 1.88 m (6' 2\")  Wt 116.9 kg (257 lb 11.5 oz)  SpO2 92%  BMI 33.09 kg/m2    Examination:  Gen: lying in bed, in no apparent distress  Wound: lumbar incision c/d/i with sutures in place, no significant erythema, swelling or drainage; no evidence of CSF leak   Neurologic:  - Alert & Oriented to person, place, time, and situation  - Follows commands briskly  - Speech fluent, spontaneous. No aphasia or dysarthria.  - No gaze preference. No apparent hemineglect.  - PERRL, EOMI  - Face " symmetric        Del Tr Bi WE WF Gr   R 5 5 5 5 5 5   L 5 5 5 5 5 5     HF KE KF DF PF EHL   R 5 5 5 5 5 5   L 5 5 5 5 5 5      Reflexes 2+ throughout  Sensation intact and symmetric to light touch throughout  Abd: Soft; Non-tender          Discharge Medications:     Current Discharge Medication List      START taking these medications    Details   oxyCODONE IR (ROXICODONE) 5 MG tablet Take 1-2 tablets (5-10 mg) by mouth every 3 hours as needed for moderate to severe pain  Qty: 30 tablet, Refills: 0    Associated Diagnoses: S/P laminectomy      lactulose (CHRONULAC) 10 GM/15ML solution Take 30 mLs (20 g) by mouth 3 times daily  Qty: 236 mL, Refills: 0    Associated Diagnoses: S/P laminectomy      polyethylene glycol (MIRALAX/GLYCOLAX) Packet Take 17 g by mouth 3 times daily  Qty: 7 packet, Refills: 1    Associated Diagnoses: S/P laminectomy      senna-docusate (SENOKOT-S;PERICOLACE) 8.6-50 MG per tablet Take 1-2 tablets by mouth 2 times daily  Qty: 30 tablet, Refills: 0    Associated Diagnoses: S/P laminectomy      cyclobenzaprine (FLEXERIL) 10 MG tablet Take 1 tablet (10 mg) by mouth 3 times daily  Qty: 30 tablet, Refills: 0    Associated Diagnoses: S/P laminectomy      order for DME Equipment being ordered: Walker Wheels () and Walker ()  Treatment Diagnosis: impaired gait secondary to laminectomy  Qty: 1 each, Refills: 0    Associated Diagnoses: S/P laminectomy         CONTINUE these medications which have NOT CHANGED    Details   omeprazole (PRILOSEC) 20 MG CR capsule Take 20 mg by mouth every evening   Refills: 3      lisinopril (PRINIVIL/ZESTRIL) 40 MG tablet Take 1 tablet (40 mg) by mouth daily  Qty: 90 tablet, Refills: 3    Associated Diagnoses: Hypertension goal BP (blood pressure) < 140/90      hydrochlorothiazide (HYDRODIURIL) 25 MG tablet Take 1 tablet (25 mg) by mouth daily  Qty: 90 tablet, Refills: 1    Associated Diagnoses: Essential hypertension      Multiple Vitamins-Minerals (MULTIVITAMIN  ADULTS PO) Take 1 tablet by mouth every morning                      Discharge Instructions and Follow-Up:   Discharge diet: Regular   Discharge activity: You may advance activity as tolerated. No strenuous exercise or heay lifting greater than 10 lbs for 4 weeks or until seen and cleared in clinic.   Discharge follow-up: Please follow-up with Opal Smith PA-C in the Neurosurgery Clinic in about 2 weeks for wound evaluation.      Wound care: Ok to shower,however no scrubbing of the wound and no soaking of the wound, meaning no bathtubs or swimming pools. Pat dry only. Leave wound open to air.       Please call if you have:  1. increased pain, redness, drainage, swelling at your incision  2. fevers > 101.5 F degrees  3. with any questions or concerns.  You may reach the Neurosurgery clinic at [phone] during regular work hours. ER at [phone].    and ask for the Neurosurgery Resident on call at [phone], during off hours or weekends.         Discharge Disposition:   Discharged to home        Shey Harris, JAMIE-BC, CCRN, CNRN  Department of Neurosurgery  Pager: [phone]

## 2017-12-21 NOTE — PLAN OF CARE
Problem: Patient Care Overview  Goal: Plan of Care/Patient Progress Review  Outcome: Adequate for Discharge Date Met: 12/21/17  VSS. Back pain controlled with tylenol and flexeril. Neuros intact. Back incision PAUL, no drainage noted. Pt had enema, large bm. Voiding spontaneously. Up indep in room and halls. Discharge medications and instructions gone over with no further questions. SO at bedside and helping pt to front door, declined need for hospital transportation.

## 2017-12-21 NOTE — PROGRESS NOTES
[facility], [place]   Neurosurgery Progress Note:    Assessment: Sergio Cadena is a 47 year old male postoperative day # 3 from lumbar 2-3 laminectomy for intradural tumor resection.     Plan:  - Serial neuro exams  - Pain control  - NPO pending bowel movement   - Aggressive bowel regimen  - BMP, Mg wnl; UA negative  - AXR: diffuse colonic air   - PRN antiemetics  - IVF until taking adequate PO  - PTA lisinopril and omeprazole   - PT/OT: HWA  - SCDs for DVT ppx  - Dispo: 6A, anticipate discharge 12/22 pending BM.    Discussed with Neurosurgery chief, who agrees.    Interval History: nausea and single episode of emesis yesterday.  Abdomen distended, uncomfortable.  Continues to pass gas.       Objective:   Temp:  [97.2  F (36.2  C)-99.3  F (37.4  C)] 99.3  F (37.4  C)  Heart Rate:  [88-96] 96  Resp:  [16] 16  BP: (128-142)/(77-84) 142/84  SpO2:  [96 %-100 %] 98 %  I/O last 3 completed shifts:  In: 60 [P.O.:60]  Out: 330 [Urine:300; Emesis/NG output:30]    Gen: lying in bed, uncomfortable   Wound: lumbar incision c/d/i with sutures in place, no significant erythema, swelling or drainage; no evidence of CSF leak   Neurologic:  - Alert & Oriented to person, place, time, and situation  - Follows commands briskly  - Speech fluent, spontaneous. No aphasia or dysarthria.  - No gaze preference. No apparent hemineglect.  - PERRL, EOMI  - Face symmetric      Del Tr Bi WE WF Gr   R 5 5 5 5 5 5   L 5 5 5 5 5 5    HF KE KF DF PF EHL   R 5 5 5 5 5 5   L 5 5 5 5 5 5     Reflexes 2+ throughout  Sensation intact and symmetric to light touch throughout  Abd: moderately distended, non-tender    LABS  Reviewed     IMAGING    Reviewed     Please contact neurosurgery resident on call with questions.    Dial * * [phone], enter [phone] when prompted.

## 2017-12-21 NOTE — PLAN OF CARE
Problem: Patient Care Overview  Goal: Plan of Care/Patient Progress Review  Outcome: Improving  POD#2 of L2-L3 laminectomy & tumor resection. Back incision approximated with sutures PAUL, no drainage noted. Neuros intact, VSS on RA, up with SBA & walker, up independently to the bathroom. Pain managed with scheduled Tylenol & flexeril, PRN oxy available - pt did not require any this evening. Neuros intact. C/o on & off nausea, x1 emesis, pt reported emesis to have looked like water - PRN Zofran effective. Refused enema, reported very small BM this evening, received PRN mineral oil, pt reported to have increased gas output & overall relief in abdomen after receiving mineral oil, pt requesting to receive additional dose of mineral oil tomorrow morning. Abdominal XR completed - see results. NPO except meds. Right PIV infusing NS at 75 mL/hr. Voiding spontaneously & without difficulty, UA sent. Continue to monitor & follow POC.

## 2017-12-22 ENCOUNTER — TELEPHONE (OUTPATIENT)
Dept: FAMILY MEDICINE | Facility: CLINIC | Age: 47
End: 2017-12-22

## 2017-12-22 NOTE — TELEPHONE ENCOUNTER
ED/Discharge Protocol    ED / Discharge Outreach Protocol    Patient Contact    Attempt # 1    Was call answered?  No.  Left message on voicemail with information to call me back.  Babs Bender RN

## 2017-12-22 NOTE — TELEPHONE ENCOUNTER
.ED/UC/IP follow up phone call:   DOS:  12/21/17  Inradural tumor, spinal meningioma    RN please call to follow up.    Number of ED visits in past 12 months = 0/1

## 2017-12-27 NOTE — TELEPHONE ENCOUNTER
ED / Discharge Outreach Protocol    Patient Contact    Attempt # 2    Was call answered?  No.  Left message on voicemail with information to call me back.    Medina MARTINS Rn

## 2018-01-02 DIAGNOSIS — I10 ESSENTIAL HYPERTENSION: ICD-10-CM

## 2018-01-02 NOTE — TELEPHONE ENCOUNTER
HYDROCHLOROTHIAZIDE 25MG TAB        Last Written Prescription Date:  7/11/17  Last Fill Quantity: 90,   # refills: 1  Last Office Visit: 7/11/17  Future Office visit:       Requested Prescriptions   Pending Prescriptions Disp Refills     hydrochlorothiazide (HYDRODIURIL) 25 MG tablet [Pharmacy Med Name: HYDROCHLOROTHIAZIDE 25MG TAB] 90 tablet      Sig: TAKE 1 TABLET BY MOUTH DAILY    Diuretics (Including Combos) Protocol Passed    1/2/2018  1:03 AM       Passed - Blood pressure under 140/90    BP Readings from Last 3 Encounters:   12/21/17 134/78   12/11/17 161/84   08/24/17 (!) 140/92                Passed - Recent or future visit with authorizing provider's specialty    Patient had office visit in the last year or has a visit in the next 30 days with authorizing provider.  See chart review.              Passed - Patient is age 18 or older       Passed - Normal serum creatinine on file in past 12 months    Recent Labs   Lab Test  12/20/17 2120   CR  0.88             Passed - Normal serum potassium on file in past 12 months    Recent Labs   Lab Test  12/20/17 2120   POTASSIUM  3.8                   Passed - Normal serum sodium on file in past 12 months    Recent Labs   Lab Test  12/20/17 2120   NA  140

## 2018-01-03 ENCOUNTER — OFFICE VISIT (OUTPATIENT)
Dept: NEUROSURGERY | Facility: CLINIC | Age: 48
End: 2018-01-03
Payer: COMMERCIAL

## 2018-01-03 VITALS
SYSTOLIC BLOOD PRESSURE: 157 MMHG | BODY MASS INDEX: 34.06 KG/M2 | HEART RATE: 114 BPM | DIASTOLIC BLOOD PRESSURE: 98 MMHG | HEIGHT: 73 IN | WEIGHT: 257 LBS

## 2018-01-03 DIAGNOSIS — D36.10 BENIGN SCHWANNOMA: Primary | ICD-10-CM

## 2018-01-03 RX ORDER — POLYETHYLENE GLYCOL 3350 17 G/17G
POWDER, FOR SOLUTION ORAL
COMMUNITY
Start: 2017-12-20 | End: 2018-04-19

## 2018-01-03 NOTE — LETTER
1/3/2018       RE: Sergio Cadena  [address] [facility] [address]     Dear Colleague,    Thank you for referring your patient, Sergio Cadena, to the [facility] NEUROSURGERY at [facility]. Please see a copy of my visit note below.      Neurosurgery clinic post op wound check  Date of visit: 1/3/2018       Procedure:: 12/18/2017 Bulmaro CHUA              DIAGNOSIS:  Lumbar intradural mass.       PROCEDURES PERFORMED:     1.  Lumbar 2-3 midline laminectomy.   2.  Lumbar intradural tumor resection.     3.  Intraoperative neuro-monitoring: SSEP, EMG lower extremities and rectal EMG      INDICATION FOR PROCEDURE:  Mr. Sergio Cadena is a 47-year-old gentleman who presented to the Neurosurgery Clinic in 06/2017 with concerns of leg pain, left lateral mid thigh pain.  He had this going on for 3 years without any treatment benefit.  On imaging of his lumbar spine, he was found to have no disk herniation or any foraminal stenosis but a 5 mm enhancing lesion at the L2-L3 level, encompassing one of the nerve roots.  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  For diagnosis as well as evaluation if this could be part of his symptoms, resection was recommended.  It was discussed in detail with him that resection of this may not benefit with his radicular symptoms but it is essential for diagnostic purposes.  He gave informed written consent for the same after understanding the risks, benefits and alternatives for the procedure in detail.   HPI:  Inpatient:The patient was admitted to the hospital on 12/18/2017 and underwent the above named operation. There no zak-operative complications. Post-operatively, the patient was transferred to Unit 6A for routine post-operative cares. The surgical pathology was consistent with a Schwannoma (WHO Grade 1). The patient's post-operative course was notable for constipation for which he received  oral stimulants as well as an enema. He was evaluated with an Abdominal X-Ray that demonstrated dilation of the transverse and descending colon. He had 2 small bowel movements on 12/20/17 and 1 large bowel movement on 12/21/2017. He was started on a clear liquid diet which he tolerated well, and was transitioned to a regular diet. He reported no nausea, vomiting, or abdominal pain after eating. On 12/21/2017, the patient had met all of his discharge criteria in that his post-operative pain was well-controlled with oral analgesics, he was ambulating, voiding and having a bowel movement, and was tolerating a regular diet. He was deemed medically and neurologically stable for discharge home.    Outpatient:   He is now 2 weeks post op.     Since discharge he has done well. His leg pain is gone. He is off of all pain medication. He has no bowel or bladder dysfunction. He has no headache He is feeling better than he has felt a long time and is anxious to go back to work but knows he should not yet. 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  He presents for routine wound check and suture/staple removal.    Patient Supplied Answers To the UC Pain Questionnaire  UC Pain -  Patient Entered Questionnaire/Answers 6/29/2017   What number best describes your pain right now:  0 = No pain  to  10 = Worst pain imaginable 1   How would you describe the pain? dull, aching   Which of the following worsen your pain? standing, sitting, walking   Which of the following improve or reduce your pain?  medication   What number best describes your LOWEST pain in past 24 hours:  0 = No pain  to  10 = Worst pain imaginable 1   What number best describes your WORST pain in past 24 hours:  0 = No pain  to  10 = Worst pain imaginable 1   When is your pain worst? Constant   What non-medicine treatments have you already had for your pain? spine injections (shots)   Have you tried treating your  "pain with medication?  Yes   Are you currently taking medications for your pain? No          Current Outpatient Prescriptions:      polyethylene glycol (MIRALAX/GLYCOLAX) powder, , Disp: , Rfl:      lactulose (CHRONULAC) 10 GM/15ML solution, Take 30 mLs (20 g) by mouth 3 times daily, Disp: 236 mL, Rfl: 0     polyethylene glycol (MIRALAX/GLYCOLAX) Packet, Take 17 g by mouth 3 times daily, Disp: 7 packet, Rfl: 1     senna-docusate (SENOKOT-S;PERICOLACE) 8.6-50 MG per tablet, Take 1-2 tablets by mouth 2 times daily, Disp: 30 tablet, Rfl: 0     cyclobenzaprine (FLEXERIL) 10 MG tablet, Take 1 tablet (10 mg) by mouth 3 times daily, Disp: 30 tablet, Rfl: 0     order for DME, Equipment being ordered: Walker Wheels () and Walker () Treatment Diagnosis: impaired gait secondary to laminectomy, Disp: 1 each, Rfl: 0     omeprazole (PRILOSEC) 20 MG CR capsule, Take 20 mg by mouth every evening , Disp: , Rfl: 3     lisinopril (PRINIVIL/ZESTRIL) 40 MG tablet, Take 1 tablet (40 mg) by mouth daily (Patient taking differently: Take 40 mg by mouth every evening ), Disp: 90 tablet, Rfl: 3     hydrochlorothiazide (HYDRODIURIL) 25 MG tablet, Take 1 tablet (25 mg) by mouth daily (Patient taking differently: Take 25 mg by mouth every evening ), Disp: 90 tablet, Rfl: 1     Multiple Vitamins-Minerals (MULTIVITAMIN ADULTS PO), Take 1 tablet by mouth every morning , Disp: , Rfl:   No Known Allergies  PMH, SOC HIST, FAM HIST, PROBLEM LIST:  All reviewed in EPIC.    OBJECTIVE:  BP (!) 157/98  Pulse 114  Ht 1.854 m (6' 1\")  Wt 116.6 kg (257 lb)  BMI 33.91 kg/m2    Imaging:  None new.    EXAM:  Well developed well nourished male found seated comfortably in exam chair.  No apparent distress. He is unaccompanied.  A&O X3.  Mood and affect WNL. Language and fund of knowledge intact.  Is able to sit and rise independently.   He has a nicely healing incision.  I prepped the wound with chloroprep and cleanly removed Monocryl without " difficulty.  Exam at discharge:      Del Tr Bi WE WF Gr   R 5 5 5 5 5 5   L 5 5 5 5 5 5      HF KE KF DF PF EHL   R 5 5 5 5 5 5   L 5 5 5 5 5 5     Exam today:  Same    Assessment/Plan:  1. Benign schwannoma      Sergio Cadena is doing well 2 weeks after his schwannoma removal. Contrary to expectations his leg pain is gone. He has no headache and so no CSF leak. I think he is probably safe to go back to work at 5 weeks postop and we set that up today. I filled out the appropriate forms. However I did caution him that if his leg pain comes back, he develops a headache, evidence of leaking from the incision or into the soft tissues must let us know and we will revise that estimate.    We do need to see him at some reasonable interval to evaluate him for recurrence. That will probably be 3 months or so postop. We will get an MRI of the lumbar spine with and without gadolinium. I'll see him on a day that Dr. Cartagena is in clinic so we can review the images together. If all is well we can probably see him on an annual basis thereafter.    I answered all of his questions, supplied him with contact information so he can call if anything else comes up.   We appreciate the opportunity to be of service in the care of this pleasant patient.  Please do call if there is anything more we can do    Opal Smith PA-C  [facility]  Department of Neurosurgery  Phone: [phone]  Fax: [phone]    This note was generated using voice recognition software. While edited for content some inaccurate phrasing may be found.

## 2018-01-03 NOTE — MR AVS SNAPSHOT
After Visit Summary   1/3/2018    Sergio Cadena    MRN: [id number]           Patient Information     Date Of Birth          1970        Visit Information        Provider Department      1/3/2018 2:30 PM Opal Smith PA-C [facility] Neurosurgery        Today's Diagnoses     Spinal meningioma (H)    -  1       Follow-ups after your visit        Your next 10 appointments already scheduled     Mar 26, 2018 10:15 AM CDT   (Arrive by 10:00 AM)   MR LUMBAR SPINE W/O & W CONTRAST with MGMR1   [facility]    [address] [place] [id number] [phone]           Take your medicines as usual, unless your doctor tells you not to. Bring a list of your current medicines to your exam (including vitamins, minerals and over-the-counter drugs).  You will be given intravenous contrast for this exam. To prepare:   The day before your exam, drink extra fluids at least six 8-ounce glasses (unless your doctor tells you to restrict your fluids).   Have a blood test (creatinine test) within 30 days of your exam. Go to your clinic or Diagnostic Imaging Department for this test.  The MRI machine uses a strong magnet. Please wear clothes without metal (snaps, zippers). A sweatsuit works well, or we may give you a hospital gown.  Please remove any body piercings and hair extensions before you arrive. You will also remove watches, jewelry, hairpins, wallets, dentures, partial dental plates and hearing aids. You may wear contact lenses, and you may be able to wear your rings. We have a safe place to keep your personal items, but it is safer to leave them at home.   **IMPORTANT** THE INSTRUCTIONS BELOW ARE ONLY FOR THOSE PATIENTS WHO HAVE BEEN TOLD THEY WILL RECEIVE SEDATION OR GENERAL ANESTHESIA DURING THEIR MRI PROCEDURE:  IF YOU WILL RECEIVE SEDATION (take medicine to help you relax during your exam):   You must get the medicine from your doctor  before you arrive. Bring the medicine to the exam. Do not take it at home.   Arrive one hour early. Bring someone who can take you home after the test. Your medicine will make you sleepy. After the exam, you may not drive, take a bus or take a taxi by yourself.   No eating 8 hours before your exam. You may have clear liquids up until 4 hours before your exam. (Clear liquids include water, clear tea, black coffee and fruit juice without pulp.)  IF YOU WILL RECEIVE ANESTHESIA (be asleep for your exam):   Arrive 1 1/2 hours early. Bring someone who can take you home after the test. You may not drive, take a bus or take a taxi by yourself.   No eating 8 hours before your exam. You may have clear liquids up until 4 hours before your exam. (Clear liquids include water, clear tea, black coffee and fruit juice without pulp.)  Please call the Imaging Department at your exam site with any questions.            Apr 03, 2018 12:00 PM CDT   (Arrive by 11:45 AM)   Return Visit with Opal Smith PA-C   [facility] Neurosurgery ([facility])    [address] [place] [id number] [phone]              Future tests that were ordered for you today     Open Future Orders        Priority Expected Expires Ordered    MRI Lumbar spine with & without gadolinium [VTH424] Routine  1/3/2019 1/3/2018            Who to contact     Please call your clinic at [phone] to:    Ask questions about your health    Make or cancel appointments    Discuss your medicines    Learn about your test results    Speak to your doctor   If you have compliments or concerns about an experience at your clinic, or if you wish to file a complaint, please contact [facility] Patient Relations at [phone] or email us at [email or web address]         Additional Information About Your Visit        [employer] Information     [employer] is an electronic gateway that provides easy,  "online access to your medical records. With [employer], you can request a clinic appointment, read your test results, renew a prescription or communicate with your care team.     To sign up for [employer] visit the website at [email or web address]   You will be asked to enter the access code listed below, as well as some personal information. Please follow the directions to create your username and password.     Your access code is: [email or web address]  Expires: 2018  6:30 AM     Your access code will  in 90 days. If you need help or a new code, please contact your [facility] or call [phone] for assistance.        Care EveryWhere ID     This is your Care EveryWhere ID. This could be used by other organizations to access your [place] medical records  [id number]        Your Vitals Were     Pulse Height BMI (Body Mass Index)             114 1.854 m (6' 1\") 33.91 kg/m2          Blood Pressure from Last 3 Encounters:   18 (!) 157/98   17 134/78   17 161/84    Weight from Last 3 Encounters:   18 116.6 kg (257 lb)   17 116.9 kg (257 lb 11.5 oz)   17 118.1 kg (260 lb 4.8 oz)                 Today's Medication Changes          These changes are accurate as of: 1/3/18  4:20 PM.  If you have any questions, ask your nurse or doctor.               These medicines have changed or have updated prescriptions.        Dose/Directions    hydrochlorothiazide 25 MG tablet   Commonly known as:  HYDRODIURIL   This may have changed:  when to take this   Used for:  Essential hypertension        Dose:  25 mg   Take 1 tablet (25 mg) by mouth daily   Quantity:  90 tablet   Refills:  1       lisinopril 40 MG tablet   Commonly known as:  PRINIVIL/ZESTRIL   This may have changed:  when to take this   Used for:  Hypertension goal BP (blood pressure) < 140/90        Dose:  40 mg   Take 1 tablet (40 mg) by mouth daily   Quantity:  90 tablet   Refills:  3         Stop " taking these medicines if you haven't already. Please contact your care team if you have questions.     oxyCODONE IR 5 MG tablet   Commonly known as:  ROXICODONE                    Primary Care Provider Office Phone # Fax #    Leda Gregory Shanks [phone] [id number] [place] [address]        Equal Access to Services     [place] [facility]IE : Hadii aad ku hadasho Soomaali, waaxda luqadaha, qaybta kaalmada adeegyada, waxay idiin hayaan adeeg samuelarlinekatty lasoraidan . So [facility] [phone].    ATENCIÓN: Si habla español, tiene a vitale disposición servicios gratuitos de asistencia lingüística. Llame al [phone].    We comply with applicable federal civil rights laws and [place] laws. We do not discriminate on the basis of race, color, national origin, age, disability, sex, sexual orientation, or gender identity.            Thank you!     Thank you for choosing [facility]  for your care. Our goal is always to provide you with excellent care. Hearing back from our patients is one way we can continue to improve our services. Please take a few minutes to complete the written survey that you may receive in the mail after your visit with us. Thank you!             Your Updated Medication List - Protect others around you: Learn how to safely use, store and throw away your medicines at www.disposemymeds.org.          This list is accurate as of: 1/3/18  4:20 PM.  Always use your most recent med list.                   Brand Name Dispense Instructions for use Diagnosis    cyclobenzaprine 10 MG tablet    FLEXERIL    30 tablet    Take 1 tablet (10 mg) by mouth 3 times daily    S/P laminectomy       hydrochlorothiazide 25 MG tablet    HYDRODIURIL    90 tablet    Take 1 tablet (25 mg) by mouth daily    Essential hypertension       lactulose 10 GM/15ML solution    CHRONULAC    236 mL    Take 30 mLs (20 g) by mouth 3 times daily    S/P laminectomy       lisinopril 40 MG tablet    PRINIVIL/ZESTRIL    90  tablet    Take 1 tablet (40 mg) by mouth daily    Hypertension goal BP (blood pressure) < 140/90       MULTIVITAMIN ADULTS PO      Take 1 tablet by mouth every morning        omeprazole 20 MG CR capsule    priLOSEC     Take 20 mg by mouth every evening        order for DME     1 each    Equipment being ordered: Walker Wheels () and Walker () Treatment Diagnosis: impaired gait secondary to laminectomy    S/P laminectomy       * polyethylene glycol Packet    MIRALAX/GLYCOLAX    7 packet    Take 17 g by mouth 3 times daily    S/P laminectomy       * polyethylene glycol powder    MIRALAX/GLYCOLAX          senna-docusate 8.6-50 MG per tablet    SENOKOT-S;PERICOLACE    30 tablet    Take 1-2 tablets by mouth 2 times daily    S/P laminectomy       * Notice:  This list has 2 medication(s) that are the same as other medications prescribed for you. Read the directions carefully, and ask your doctor or other care provider to review them with you.

## 2018-01-03 NOTE — PROGRESS NOTES
Neurosurgery clinic post op wound check  Date of visit: 1/3/2018       Procedure:: 12/18/2017 Bulmaro CHUA              DIAGNOSIS:  Lumbar intradural mass.       PROCEDURES PERFORMED:     1.  Lumbar 2-3 midline laminectomy.   2.  Lumbar intradural tumor resection.     3.  Intraoperative neuro-monitoring: SSEP, EMG lower extremities and rectal EMG      INDICATION FOR PROCEDURE:  Mr. Sergio Cadena is a 47-year-old gentleman who presented to the Neurosurgery Clinic in 06/2017 with concerns of leg pain, left lateral mid thigh pain.  He had this going on for 3 years without any treatment benefit.  On imaging of his lumbar spine, he was found to have no disk herniation or any foraminal stenosis but a 5 mm enhancing lesion at the L2-L3 level, encompassing one of the nerve roots.  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  For diagnosis as well as evaluation if this could be part of his symptoms, resection was recommended.  It was discussed in detail with him that resection of this may not benefit with his radicular symptoms but it is essential for diagnostic purposes.  He gave informed written consent for the same after understanding the risks, benefits and alternatives for the procedure in detail.   HPI:  Inpatient:The patient was admitted to the hospital on 12/18/2017 and underwent the above named operation. There no zak-operative complications. Post-operatively, the patient was transferred to [address] for routine post-operative cares. The surgical pathology was consistent with a Schwannoma (WHO Grade 1). The patient's post-operative course was notable for constipation for which he received oral stimulants as well as an enema. He was evaluated with an Abdominal X-Ray that demonstrated dilation of the transverse and descending colon. He had 2 small bowel movements on 12/20/17 and 1 large bowel movement on 12/21/2017. He was started on a clear liquid diet which he tolerated well, and was  transitioned to a regular diet. He reported no nausea, vomiting, or abdominal pain after eating. On 12/21/2017, the patient had met all of his discharge criteria in that his post-operative pain was well-controlled with oral analgesics, he was ambulating, voiding and having a bowel movement, and was tolerating a regular diet. He was deemed medically and neurologically stable for discharge home.    Outpatient:   He is now 2 weeks post op.     Since discharge he has done well. His leg pain is gone. He is off of all pain medication. He has no bowel or bladder dysfunction. He has no headache He is feeling better than he has felt a long time and is anxious to go back to work but knows he should not yet. 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  He presents for routine wound check and suture/staple removal.    Patient Supplied Answers To the [facility] Pain Questionnaire  [facility] Pain -  Patient Entered Questionnaire/Answers 6/29/2017   What number best describes your pain right now:  0 = No pain  to  10 = Worst pain imaginable 1   How would you describe the pain? dull, aching   Which of the following worsen your pain? standing, sitting, walking   Which of the following improve or reduce your pain?  medication   What number best describes your LOWEST pain in past 24 hours:  0 = No pain  to  10 = Worst pain imaginable 1   What number best describes your WORST pain in past 24 hours:  0 = No pain  to  10 = Worst pain imaginable 1   When is your pain worst? Constant   What non-medicine treatments have you already had for your pain? spine injections (shots)   Have you tried treating your pain with medication?  Yes   Are you currently taking medications for your pain? No          Current Outpatient Prescriptions:      polyethylene glycol (MIRALAX/GLYCOLAX) powder, , Disp: , Rfl:      lactulose (CHRONULAC) 10 GM/15ML solution, Take 30 mLs (20 g) by mouth 3 times daily, Disp: 236 mL,  "Rfl: 0     polyethylene glycol (MIRALAX/GLYCOLAX) Packet, Take 17 g by mouth 3 times daily, Disp: 7 packet, Rfl: 1     senna-docusate (SENOKOT-S;PERICOLACE) 8.6-50 MG per tablet, Take 1-2 tablets by mouth 2 times daily, Disp: 30 tablet, Rfl: 0     cyclobenzaprine (FLEXERIL) 10 MG tablet, Take 1 tablet (10 mg) by mouth 3 times daily, Disp: 30 tablet, Rfl: 0     order for DME, Equipment being ordered: Walker Wheels () and Walker () Treatment Diagnosis: impaired gait secondary to laminectomy, Disp: 1 each, Rfl: 0     omeprazole (PRILOSEC) 20 MG CR capsule, Take 20 mg by mouth every evening , Disp: , Rfl: 3     lisinopril (PRINIVIL/ZESTRIL) 40 MG tablet, Take 1 tablet (40 mg) by mouth daily (Patient taking differently: Take 40 mg by mouth every evening ), Disp: 90 tablet, Rfl: 3     hydrochlorothiazide (HYDRODIURIL) 25 MG tablet, Take 1 tablet (25 mg) by mouth daily (Patient taking differently: Take 25 mg by mouth every evening ), Disp: 90 tablet, Rfl: 1     Multiple Vitamins-Minerals (MULTIVITAMIN ADULTS PO), Take 1 tablet by mouth every morning , Disp: , Rfl:   No Known Allergies  PMH, SOC HIST, FAM HIST, PROBLEM LIST:  All reviewed in EPIC.    OBJECTIVE:  BP (!) 157/98  Pulse 114  Ht 1.854 m (6' 1\")  Wt 116.6 kg (257 lb)  BMI 33.91 kg/m2    Imaging:  None new.    EXAM:  Well developed well nourished male found seated comfortably in exam chair.  No apparent distress. He is unaccompanied.  A&O X3.  Mood and affect WNL. Language and fund of knowledge intact.  Is able to sit and rise independently.   He has a nicely healing incision.  I prepped the wound with chloroprep and cleanly removed Monocryl without difficulty.  Exam at discharge:      Del Tr Bi WE WF Gr   R 5 5 5 5 5 5   L 5 5 5 5 5 5      HF KE KF DF PF EHL   R 5 5 5 5 5 5   L 5 5 5 5 5 5     Exam today:  Same    Assessment/Plan:  1. Benign schwannoma      Sergio Cadena is doing well 2 weeks after his schwannoma removal. Contrary to " expectations his leg pain is gone. He has no headache and so no CSF leak. I think he is probably safe to go back to work at 5 weeks postop and we set that up today. I filled out the appropriate forms. However I did caution him that if his leg pain comes back, he develops a headache, evidence of leaking from the incision or into the soft tissues must let us know and we will revise that estimate.    We do need to see him at some reasonable interval to evaluate him for recurrence. That will probably be 3 months or so postop. We will get an MRI of the lumbar spine with and without gadolinium. I'll see him on a day that Dr. Cartagena is in clinic so we can review the images together. If all is well we can probably see him on an annual basis thereafter.    I answered all of his questions, supplied him with contact information so he can call if anything else comes up.   We appreciate the opportunity to be of service in the care of this pleasant patient.  Please do call if there is anything more we can do    Opal Smith PA-C  [facility]  Department of Neurosurgery  Phone: [phone]  Fax: [phone]    This note was generated using voice recognition software. While edited for content some inaccurate phrasing may be found.

## 2018-01-05 RX ORDER — HYDROCHLOROTHIAZIDE 25 MG/1
TABLET ORAL
Qty: 90 TABLET | Refills: 1 | Status: SHIPPED | OUTPATIENT
Start: 2018-01-05 | End: 2018-08-03

## 2018-01-05 NOTE — TELEPHONE ENCOUNTER
Prescription approved per [facility] Refill Protocol.  Jayden Butler RN

## 2018-03-26 ENCOUNTER — RADIANT APPOINTMENT (OUTPATIENT)
Dept: MRI IMAGING | Facility: CLINIC | Age: 48
End: 2018-03-26
Attending: PHYSICIAN ASSISTANT
Payer: COMMERCIAL

## 2018-03-26 DIAGNOSIS — M54.50 LUMBAR PAIN: ICD-10-CM

## 2018-03-26 PROCEDURE — 72148 MRI LUMBAR SPINE W/O DYE: CPT | Performed by: RADIOLOGY

## 2018-03-26 PROCEDURE — A9585 GADOBUTROL INJECTION: HCPCS | Performed by: PHYSICIAN ASSISTANT

## 2018-03-26 RX ORDER — GADOBUTROL 604.72 MG/ML
10 INJECTION INTRAVENOUS ONCE
Status: COMPLETED | OUTPATIENT
Start: 2018-03-26 | End: 2018-03-26

## 2018-03-26 RX ADMIN — GADOBUTROL 10 ML: 604.72 INJECTION INTRAVENOUS at 10:47

## 2018-04-19 ENCOUNTER — OFFICE VISIT (OUTPATIENT)
Dept: NEUROSURGERY | Facility: CLINIC | Age: 48
End: 2018-04-19
Payer: COMMERCIAL

## 2018-04-19 VITALS
RESPIRATION RATE: 24 BRPM | OXYGEN SATURATION: 94 % | BODY MASS INDEX: 34.34 KG/M2 | DIASTOLIC BLOOD PRESSURE: 103 MMHG | TEMPERATURE: 98.1 F | HEART RATE: 61 BPM | SYSTOLIC BLOOD PRESSURE: 152 MMHG | HEIGHT: 73 IN | WEIGHT: 259.1 LBS

## 2018-04-19 DIAGNOSIS — D36.10 BENIGN SCHWANNOMA: Primary | ICD-10-CM

## 2018-04-19 RX ORDER — CHLORAL HYDRATE 500 MG
1 CAPSULE ORAL DAILY
COMMUNITY

## 2018-04-19 ASSESSMENT — PAIN SCALES - GENERAL: PAINLEVEL: NO PAIN (0)

## 2018-04-19 NOTE — NURSING NOTE
Chief Complaint   Patient presents with     RECHECK     [facility]- 3 MONTHS POST-OP F/U     Tin Jessica, CMA

## 2018-04-19 NOTE — MR AVS SNAPSHOT
"              After Visit Summary   2018    Sergio Cadena    MRN: [id number]           Patient Information     Date Of Birth          1970        Visit Information        Provider Department      2018 3:15 PM Tash Cartagena MD [facility] Neurosurgery        Today's Diagnoses     Benign schwannoma    -  1       Follow-ups after your visit        Who to contact     Please call your clinic at [phone] to:    Ask questions about your health    Make or cancel appointments    Discuss your medicines    Learn about your test results    Speak to your doctor            Additional Information About Your Visit        MyChart Information     [employer] is an electronic gateway that provides easy, online access to your medical records. With [employer], you can request a clinic appointment, read your test results, renew a prescription or communicate with your care team.     To sign up for [employer] visit the website at [email or web address]   You will be asked to enter the access code listed below, as well as some personal information. Please follow the directions to create your username and password.     Your access code is: [email or web address]  Expires: 2018  6:30 AM     Your access code will  in 90 days. If you need help or a new code, please contact your [facility] or call [phone] for assistance.        Care EveryWhere ID     This is your Care EveryWhere ID. This could be used by other organizations to access your [place] medical records  [id number]        Your Vitals Were     Pulse Temperature Respirations Height Pulse Oximetry BMI (Body Mass Index)    61 98.1  F (36.7  C) (Oral) 24 1.854 m (6' 1\") 94% 34.18 kg/m2       Blood Pressure from Last 3 Encounters:   18 (!) 152/103   18 (!) 157/98   17 134/78    Weight from Last 3 Encounters:   18 117.5 kg (259 lb 1.6 oz)   18 116.6 kg (257 lb)   17 116.9 kg (257 lb 11.5 oz) "              Today, you had the following     No orders found for display         Today's Medication Changes          These changes are accurate as of 4/19/18 11:59 PM.  If you have any questions, ask your nurse or doctor.               These medicines have changed or have updated prescriptions.        Dose/Directions    lisinopril 40 MG tablet   Commonly known as:  PRINIVIL/ZESTRIL   This may have changed:  when to take this   Used for:  Hypertension goal BP (blood pressure) < 140/90        Dose:  40 mg   Take 1 tablet (40 mg) by mouth daily   Quantity:  90 tablet   Refills:  3                Primary Care Provider Office Phone # Fax #    Leda Gregory Shanks [phone] [address] [facility] [address]        Equal Access to Services     HOWARD BROWNING : Josie Arceo, wajairo mercer, qaybta kaalmasamantha gerber, donnell mckeon. So [facility] [phone].    ATENCIÓN: Si habla español, tiene a vitale disposición servicios gratuitos de asistencia lingüística. LlSt. Anthony's Hospital [phone].    We comply with applicable federal civil rights laws and [place] laws. We do not discriminate on the basis of race, color, national origin, age, disability, sex, sexual orientation, or gender identity.            Thank you!     Thank you for choosing [facility]  for your care. Our goal is always to provide you with excellent care. Hearing back from our patients is one way we can continue to improve our services. Please take a few minutes to complete the written survey that you may receive in the mail after your visit with us. Thank you!             Your Updated Medication List - Protect others around you: Learn how to safely use, store and throw away your medicines at www.disposemymeds.org.          This list is accurate as of 4/19/18 11:59 PM.  Always use your most recent med list.                   Brand Name Dispense Instructions for use Diagnosis    fish  oil-omega-3 fatty acids 1000 MG capsule      Take 1 g by mouth daily        hydrochlorothiazide 25 MG tablet    HYDRODIURIL    90 tablet    TAKE 1 TABLET BY MOUTH DAILY    Essential hypertension       lisinopril 40 MG tablet    PRINIVIL/ZESTRIL    90 tablet    Take 1 tablet (40 mg) by mouth daily    Hypertension goal BP (blood pressure) < 140/90       omeprazole 20 MG CR capsule    priLOSEC     Take 20 mg by mouth every evening        order for DME     1 each    Equipment being ordered: Walker Wheels () and Walker () Treatment Diagnosis: impaired gait secondary to laminectomy    S/P laminectomy

## 2018-04-19 NOTE — LETTER
4/19/2018       RE: Sergio Cadena  [address] [id number]     Dear Colleague,    Thank you for referring your patient, Sergio Cadena, to the [facility] NEUROSURGERY at [facility]. Please see a copy of my visit note below.    Service Date: 04/19/2018      CHIEF COMPLAINT:  Three-month followup status post resection of L2-L3 schwannoma.      HISTORY OF PRESENT ILLNESS:  Mr. Cadena is a 48-year-old gentleman who presented to us in 06/2017 with a left hip and thigh pain.  This has been ongoing for 3 years prior to that.  MRI of the lumbar spine revealed an enhancing lesion at the L2-L3 level measuring about 5 mm in diameter.  This was thought to be a nerve sheath tumor or a meningioma.  The patient underwent a L2-L3 midline laminectomy and resection of the intradural tumor by Dr. Cartagena on 12/18/2017.  His postoperative course went well with no complications.  He presents today for his 3-month followup appointment. He reports that his pain has essentially resolved following the surgery.  He is back to working as an .  Denies any new weakness, numbness, tingling or other neurological deficits.  He is quite happy with the results.  His incision has healed completely.      PAST MEDICAL HISTORY:  Past Medical History:   Diagnosis Date     Esophageal reflux     EGD 9/05     Hypertension      Nerve sheath tumor vs meningioma 5/16/2017     Stricture and stenosis of esophagus     Dilated 9/05     PAST SURGICAL HISTORY:  Past Surgical History:   Procedure Laterality Date     Fracture left elbow repaired by casting  1980     Gastroscopy  &  Esophageal dilatation  7/9/2003     GI SURGERY       LAMINECTOMY LUMBAR ONE LEVEL N/A 12/18/2017    Procedure: LAMINECTOMY LUMBAR ONE LEVEL;  Lumbar 2-3 Laminectomy with Removal of Tumor ;  Surgeon: Tash Cartagena MD;  Location: [facility] OR     LAMINECTOMY, EXCISE TUMOR LUMBAR, COMBINED N/A  2017    Procedure: COMBINED LAMINECTOMY, EXCISE TUMOR LUMBAR;;  Surgeon: Tash Chua MD;  Location: [facility] OR     ORTHOPEDIC SURGERY          PHYSICAL EXAMINATION:  The patient is awake, alert and oriented x3.  He is in pleasant mood.  His cranial nerves are grossly intact.  He has 5/5 in upper and lower extremities bilaterally.  Sensation is intact to light touch.  His gait is within normal limits.  Reflexes are physiologic and symmetric.      IMAGING:  MRI of the lumbar spine that was obtained on 2018 with and without contrast revealed post-surgical changes with no evidence of tumor recurrence.      ASSESSMENT/PLAN:  Mr. Cadena is a 48-year-old gentleman presenting for a 3-month followup status post resection of an L2-L3 schwannoma doing well overall.  I staffed this patient with Dr. Chua and we are happy with his progress.  At this point, we follow up conservatively.  We will obtain an MRI in 1 year.  The patient can obtain his MRI close to home and can send the images for us to review.  He knows to contact us if there are any questions or concerns.       I have seen this patient with the resident and formulated a plan and agree with this note.  JESSEE CHUA MD       As dictated by ADIS KAPLAN MD            D: 2018   T: 2018   MT: CYNTHIA      Name:     CARRIE CADENA   MRN:      [id number]        Account:      [id number]   :      1970           Service Date: 2018      Document: [id number]

## 2018-04-20 NOTE — PROGRESS NOTES
Service Date: 04/19/2018      CHIEF COMPLAINT:  Three-month followup status post resection of L2-L3 schwannoma.      HISTORY OF PRESENT ILLNESS:  Mr. Cadena is a 48-year-old gentleman who presented to us in 06/2017 with a left hip and thigh pain.  This has been ongoing for 3 years prior to that.  MRI of the lumbar spine revealed an enhancing lesion at the L2-L3 level measuring about 5 mm in diameter.  This was thought to be a nerve sheath tumor or a meningioma.  The patient underwent a L2-L3 midline laminectomy and resection of the intradural tumor by Dr. Cartagena on 12/18/2017.  His postoperative course went well with no complications.  He presents today for his 3-month followup appointment. He reports that his pain has essentially resolved following the surgery.  He is back to working as an .  Denies any new weakness, numbness, tingling or other neurological deficits.  He is quite happy with the results.  His incision has healed completely.      PAST MEDICAL HISTORY:  Past Medical History:   Diagnosis Date     Esophageal reflux     EGD 9/05     Hypertension      Nerve sheath tumor vs meningioma 5/16/2017     Stricture and stenosis of esophagus     Dilated 9/05     PAST SURGICAL HISTORY:  Past Surgical History:   Procedure Laterality Date     Fracture left elbow repaired by casting  1980     Gastroscopy  &  Esophageal dilatation  7/9/2003     GI SURGERY       LAMINECTOMY LUMBAR ONE LEVEL N/A 12/18/2017    Procedure: LAMINECTOMY LUMBAR ONE LEVEL;  Lumbar 2-3 Laminectomy with Removal of Tumor ;  Surgeon: Tash Cartagena MD;  Location: [facility] OR     LAMINECTOMY, EXCISE TUMOR LUMBAR, COMBINED N/A 12/18/2017    Procedure: COMBINED LAMINECTOMY, EXCISE TUMOR LUMBAR;;  Surgeon: Tash Cartagena MD;  Location: [facility] OR     ORTHOPEDIC SURGERY          PHYSICAL EXAMINATION:  The patient is awake, alert and oriented x3.  He is in pleasant mood.  His cranial nerves are grossly intact.  He has 5/5 in upper and  lower extremities bilaterally.  Sensation is intact to light touch.  His gait is within normal limits.  Reflexes are physiologic and symmetric.      IMAGING:  MRI of the lumbar spine that was obtained on 2018 with and without contrast revealed post-surgical changes with no evidence of tumor recurrence.      ASSESSMENT/PLAN:  Mr. Cadena is a 48-year-old gentleman presenting for a 3-month followup status post resection of an L2-L3 schwannoma doing well overall.  I staffed this patient with Dr. Chua and we are happy with his progress.  At this point, we follow up conservatively.  We will obtain an MRI in 1 year.  The patient can obtain his MRI close to home and can send the images for us to review.  He knows to contact us if there are any questions or concerns.       I have seen this patient with the resident and formulated a plan and agree with this note.  JESSEE CHUA MD       As dictated by ADIS KAPLAN MD            D: 2018   T: 2018   MT: CYNTHIA      Name:     CARRIE CADENA   MRN:      [id number]        Account:      [id number]   :      1970           Service Date: 2018      Document: [id number]

## 2018-07-01 DIAGNOSIS — I10 HYPERTENSION GOAL BP (BLOOD PRESSURE) < 140/90: ICD-10-CM

## 2018-07-01 DIAGNOSIS — K21.9 GASTROESOPHAGEAL REFLUX DISEASE, ESOPHAGITIS PRESENCE NOT SPECIFIED: Primary | ICD-10-CM

## 2018-07-02 NOTE — TELEPHONE ENCOUNTER
"OMEPRAZOLE 20MG DR CAP        Last Written Prescription Date:  8/14/17  Last Fill Quantity: na,   # refills: na  Last Office Visit: 7/11/17  Future Office visit:       lisinopril (PRINIVIL/ZESTRIL) 40 MG tablet      Last Written Prescription Date:  7/11/17  Last Fill Quantity: 90,   # refills: 3  Last Office Visit: 7/11/17  Future Office visit:       Requested Prescriptions   Pending Prescriptions Disp Refills     omeprazole (PRILOSEC) 20 MG CR capsule [Pharmacy Med Name: OMEPRAZOLE 20MG DR CAP] 90 capsule      Sig: TAKE ONE CAPSULE BY MOUTH DAILY    PPI Protocol Passed    7/1/2018  5:04 AM       Passed - Not on Clopidogrel (unless Pantoprazole ordered)       Passed - No diagnosis of osteoporosis on record       Passed - Recent (12 mo) or future (30 days) visit within the authorizing provider's specialty    Patient had office visit in the last 12 months or has a visit in the next 30 days with authorizing provider or within the authorizing provider's specialty.  See \"Patient Info\" tab in inbasket, or \"Choose Columns\" in Meds & Orders section of the refill encounter.           Passed - Patient is age 18 or older        lisinopril (PRINIVIL/ZESTRIL) 40 MG tablet [Pharmacy Med Name: LISINOPRIL 40MG TAB] 90 tablet      Sig: TAKE 1 TABLET BY MOUTH DAILY    ACE Inhibitors (Including Combos) Protocol Failed    7/1/2018  5:04 AM       Failed - Blood pressure under 140/90 in past 12 months    BP Readings from Last 3 Encounters:   04/19/18 (!) 152/103   01/03/18 (!) 157/98   12/21/17 134/78                Passed - Recent (12 mo) or future (30 days) visit within the authorizing provider's specialty    Patient had office visit in the last 12 months or has a visit in the next 30 days with authorizing provider or within the authorizing provider's specialty.  See \"Patient Info\" tab in inbasket, or \"Choose Columns\" in Meds & Orders section of the refill encounter.           Passed - Patient is age 18 or older       Passed - Normal " serum creatinine on file in past 12 months    Recent Labs   Lab Test  12/20/17 2120   CR  0.88            Passed - Normal serum potassium on file in past 12 months    Recent Labs   Lab Test  12/20/17 2120   POTASSIUM  3.8

## 2018-07-03 RX ORDER — LISINOPRIL 40 MG/1
TABLET ORAL
Qty: 60 TABLET | Refills: 0 | Status: SHIPPED | OUTPATIENT
Start: 2018-07-03 | End: 2018-08-29

## 2018-07-03 NOTE — TELEPHONE ENCOUNTER
Last seen in clinic 7/2017. Needs an OV.  I will give him two months of blood pressure meds so he has time to get in.     SHWETHA Shanks MD

## 2018-08-03 DIAGNOSIS — I10 ESSENTIAL HYPERTENSION: ICD-10-CM

## 2018-08-03 RX ORDER — HYDROCHLOROTHIAZIDE 25 MG/1
25 TABLET ORAL DAILY
Qty: 30 TABLET | Refills: 0 | Status: SHIPPED | OUTPATIENT
Start: 2018-08-03 | End: 2018-09-05

## 2018-08-03 NOTE — TELEPHONE ENCOUNTER
"Requested Prescriptions   Pending Prescriptions Disp Refills     hydrochlorothiazide (HYDRODIURIL) 25 MG tablet [Pharmacy Med Name: HYDROCHLOROTHIAZIDE 25MG TAB] 90 tablet     Last Written Prescription Date:  1/5/18  Last Fill Quantity: 90,  # refills: 1   Last office visit: 7/11/2017 with prescribing provider:  7/11/17 concha   Future Office Visit:     Sig: TAKE 1 TABLET BY MOUTH DAILY    Diuretics (Including Combos) Protocol Failed    8/3/2018  1:05 AM       Failed - Blood pressure under 140/90 in past 12 months    BP Readings from Last 3 Encounters:   04/19/18 (!) 152/103   01/03/18 (!) 157/98   12/21/17 134/78                Failed - Recent (12 mo) or future (30 days) visit within the authorizing provider's specialty    Patient had office visit in the last 12 months or has a visit in the next 30 days with authorizing provider or within the authorizing provider's specialty.  See \"Patient Info\" tab in inbasket, or \"Choose Columns\" in Meds & Orders section of the refill encounter.           Passed - Patient is age 18 or older       Passed - Normal serum creatinine on file in past 12 months    Recent Labs   Lab Test  12/20/17 2120   CR  0.88             Passed - Normal serum potassium on file in past 12 months    Recent Labs   Lab Test  12/20/17 2120   POTASSIUM  3.8                   Passed - Normal serum sodium on file in past 12 months    Recent Labs   Lab Test  12/20/17 2120   NA  140                "

## 2018-08-29 DIAGNOSIS — I10 HYPERTENSION GOAL BP (BLOOD PRESSURE) < 140/90: ICD-10-CM

## 2018-08-29 RX ORDER — LISINOPRIL 40 MG/1
TABLET ORAL
Qty: 30 TABLET | Refills: 0 | Status: SHIPPED | OUTPATIENT
Start: 2018-08-29 | End: 2018-10-05

## 2018-08-29 NOTE — TELEPHONE ENCOUNTER
Medication is being filled for 1 time refill only due to:  Patient needs to be seen because it has been more than one year since last visit.   Jayden Butler RN

## 2018-08-29 NOTE — TELEPHONE ENCOUNTER
"LISINOPRIL 40MG TAB        Last Written Prescription Date:  7/3/18  Last Fill Quantity: 60,   # refills: 0  Last Office Visit: 7/11/17  Future Office visit:       Requested Prescriptions   Pending Prescriptions Disp Refills     lisinopril (PRINIVIL/ZESTRIL) 40 MG tablet [Pharmacy Med Name: LISINOPRIL 40MG TAB] 60 tablet      Sig: TAKE 1 TABLET BY MOUTH DAILY    ACE Inhibitors (Including Combos) Protocol Failed    8/29/2018  1:05 AM       Failed - Blood pressure under 140/90 in past 12 months    BP Readings from Last 3 Encounters:   04/19/18 (!) 152/103   01/03/18 (!) 157/98   12/21/17 134/78                Failed - Recent (12 mo) or future (30 days) visit within the authorizing provider's specialty    Patient had office visit in the last 12 months or has a visit in the next 30 days with authorizing provider or within the authorizing provider's specialty.  See \"Patient Info\" tab in inbasket, or \"Choose Columns\" in Meds & Orders section of the refill encounter.           Passed - Patient is age 18 or older       Passed - Normal serum creatinine on file in past 12 months    Recent Labs   Lab Test  12/20/17 2120   CR  0.88            Passed - Normal serum potassium on file in past 12 months    Recent Labs   Lab Test  12/20/17 2120   POTASSIUM  3.8               "

## 2018-09-19 DIAGNOSIS — I10 ESSENTIAL HYPERTENSION: ICD-10-CM

## 2018-09-19 RX ORDER — HYDROCHLOROTHIAZIDE 25 MG/1
TABLET ORAL
Qty: 14 TABLET | Refills: 0 | OUTPATIENT
Start: 2018-09-19

## 2018-09-19 NOTE — TELEPHONE ENCOUNTER
See 9/5/18 refill encounter. Message left on patient's personally identified vm to call the clinic at [phone] to schedule an appointment.  Jayden Butler RN

## 2018-09-19 NOTE — TELEPHONE ENCOUNTER
"HYDROCHLOROTHIAZIDE 25MG TAB        Last Written Prescription Date:  9/6/18  Last Fill Quantity: 14,   # refills: 0  Last Office Visit: 7/11/17  Future Office visit:       Requested Prescriptions   Pending Prescriptions Disp Refills     hydrochlorothiazide (HYDRODIURIL) 25 MG tablet [Pharmacy Med Name: HYDROCHLOROTHIAZIDE 25MG TAB] 14 tablet      Sig: TAKE 1 TABLET BY MOUTH DAILY - NEED TO BE SEEN BY PHYSICIAN FOR FUTURE REFILLS    Diuretics (Including Combos) Protocol Failed    9/19/2018  1:03 AM       Failed - Blood pressure under 140/90 in past 12 months    BP Readings from Last 3 Encounters:   04/19/18 (!) 152/103   01/03/18 (!) 157/98   12/21/17 134/78                Passed - Recent (12 mo) or future (30 days) visit within the authorizing provider's specialty    Patient had office visit in the last 12 months or has a visit in the next 30 days with authorizing provider or within the authorizing provider's specialty.  See \"Patient Info\" tab in inbasket, or \"Choose Columns\" in Meds & Orders section of the refill encounter.           Passed - Patient is age 18 or older       Passed - Normal serum creatinine on file in past 12 months    Recent Labs   Lab Test  12/20/17 2120   CR  0.88             Passed - Normal serum potassium on file in past 12 months    Recent Labs   Lab Test  12/20/17 2120   POTASSIUM  3.8                   Passed - Normal serum sodium on file in past 12 months    Recent Labs   Lab Test  12/20/17 2120   NA  140                "

## 2018-10-03 DIAGNOSIS — I10 ESSENTIAL HYPERTENSION: ICD-10-CM

## 2018-10-03 NOTE — TELEPHONE ENCOUNTER
"hydrochlorothiazide (HYDRODIURIL) 25 MG tablet        Last Written Prescription Date:  9/6/18  Last Fill Quantity: 14,   # refills: 0  Last Office Visit: 7/11/17  Future Office visit:       Requested Prescriptions   Pending Prescriptions Disp Refills     hydrochlorothiazide (HYDRODIURIL) 25 MG tablet 14 tablet 0     Sig: Take 1 tablet (25 mg) by mouth daily No further refills until he has been seen    Diuretics (Including Combos) Protocol Failed    10/3/2018 10:58 AM       Failed - Blood pressure under 140/90 in past 12 months    BP Readings from Last 3 Encounters:   04/19/18 (!) 152/103   01/03/18 (!) 157/98   12/21/17 134/78                Failed - Recent (12 mo) or future (30 days) visit within the authorizing provider's specialty    Patient had office visit in the last 12 months or has a visit in the next 30 days with authorizing provider or within the authorizing provider's specialty.  See \"Patient Info\" tab in inbasket, or \"Choose Columns\" in Meds & Orders section of the refill encounter.           Passed - Patient is age 18 or older       Passed - Normal serum creatinine on file in past 12 months    Recent Labs   Lab Test  12/20/17 2120   CR  0.88             Passed - Normal serum potassium on file in past 12 months    Recent Labs   Lab Test  12/20/17 2120   POTASSIUM  3.8                   Passed - Normal serum sodium on file in past 12 months    Recent Labs   Lab Test  12/20/17 2120   NA  140                "

## 2018-10-03 NOTE — TELEPHONE ENCOUNTER
Routing refill request to provider for review/approval because:  Blood pressure not in range.    Robyn Salgado RN

## 2018-10-04 RX ORDER — HYDROCHLOROTHIAZIDE 25 MG/1
25 TABLET ORAL DAILY
Qty: 30 TABLET | Refills: 0 | Status: SHIPPED | OUTPATIENT
Start: 2018-10-04 | End: 2019-05-09

## 2018-10-04 NOTE — TELEPHONE ENCOUNTER
He is due for an OV to discuss and review meds, possibly to do labs. Please let him know.   One month of meds is filled   SHWETHA Shanks MD

## 2018-10-08 NOTE — TELEPHONE ENCOUNTER
Message left on patient's personally identified vm letting him know to make an appointment before further refills are needed.  Jayden Butler RN

## 2018-11-09 DIAGNOSIS — I10 ESSENTIAL HYPERTENSION: ICD-10-CM

## 2018-11-09 NOTE — TELEPHONE ENCOUNTER
"Requested Prescriptions   Pending Prescriptions Disp Refills     hydrochlorothiazide (HYDRODIURIL) 25 MG tablet [Pharmacy Med Name: HYDROCHLOROTHIAZIDE 25MG TAB] 30 tablet     Last Written Prescription Date:  10/4/18  Last Fill Quantity: 30,  # refills: 0   Last office visit: 7/11/2017 with prescribing provider:  Dimitris   Future Office Visit:     Sig: TAKE 1 TABLET BY MOUTH DAILY - NEED TO BE SEEN BY PHYSICIAN FOR FUTURE REFILLS    Diuretics (Including Combos) Protocol Failed    11/9/2018  1:05 AM       Failed - Blood pressure under 140/90 in past 12 months    BP Readings from Last 3 Encounters:   04/19/18 (!) 152/103   01/03/18 (!) 157/98   12/21/17 134/78                Failed - Recent (12 mo) or future (30 days) visit within the authorizing provider's specialty    Patient had office visit in the last 12 months or has a visit in the next 30 days with authorizing provider or within the authorizing provider's specialty.  See \"Patient Info\" tab in inbasket, or \"Choose Columns\" in Meds & Orders section of the refill encounter.             Passed - Patient is age 18 or older       Passed - Normal serum creatinine on file in past 12 months    Recent Labs   Lab Test  12/20/17 2120   CR  0.88             Passed - Normal serum potassium on file in past 12 months    Recent Labs   Lab Test  12/20/17 2120   POTASSIUM  3.8                   Passed - Normal serum sodium on file in past 12 months    Recent Labs   Lab Test  12/20/17 2120   NA  140                "

## 2018-11-13 RX ORDER — HYDROCHLOROTHIAZIDE 25 MG/1
TABLET ORAL
Qty: 30 TABLET | OUTPATIENT
Start: 2018-11-13

## 2018-11-13 NOTE — TELEPHONE ENCOUNTER
Sorry - no further refills.  Very overdue for an office visit. Has been asked to come in multiple times   SHWETHA Shanks MD

## 2018-12-19 DIAGNOSIS — D33.4 SCHWANNOMA OF SPINAL CORD (H): Primary | ICD-10-CM

## 2019-05-09 ENCOUNTER — OFFICE VISIT (OUTPATIENT)
Dept: FAMILY MEDICINE | Facility: CLINIC | Age: 49
End: 2019-05-09
Payer: COMMERCIAL

## 2019-05-09 VITALS
DIASTOLIC BLOOD PRESSURE: 88 MMHG | HEART RATE: 72 BPM | HEIGHT: 73 IN | SYSTOLIC BLOOD PRESSURE: 136 MMHG | TEMPERATURE: 98.1 F | WEIGHT: 258.4 LBS | BODY MASS INDEX: 34.25 KG/M2 | RESPIRATION RATE: 14 BRPM

## 2019-05-09 DIAGNOSIS — K21.9 GASTROESOPHAGEAL REFLUX DISEASE, ESOPHAGITIS PRESENCE NOT SPECIFIED: ICD-10-CM

## 2019-05-09 DIAGNOSIS — Z12.11 SPECIAL SCREENING FOR MALIGNANT NEOPLASMS, COLON: ICD-10-CM

## 2019-05-09 DIAGNOSIS — E78.5 HYPERLIPIDEMIA LDL GOAL <130: ICD-10-CM

## 2019-05-09 DIAGNOSIS — I10 ESSENTIAL HYPERTENSION: Primary | ICD-10-CM

## 2019-05-09 LAB
ANION GAP SERPL CALCULATED.3IONS-SCNC: 4 MMOL/L (ref 3–14)
BUN SERPL-MCNC: 14 MG/DL (ref 7–30)
CALCIUM SERPL-MCNC: 9.4 MG/DL (ref 8.5–10.1)
CHLORIDE SERPL-SCNC: 104 MMOL/L (ref 94–109)
CHOLEST SERPL-MCNC: 208 MG/DL
CO2 SERPL-SCNC: 29 MMOL/L (ref 20–32)
CREAT SERPL-MCNC: 0.86 MG/DL (ref 0.66–1.25)
GFR SERPL CREATININE-BSD FRML MDRD: >90 ML/MIN/{1.73_M2}
GLUCOSE SERPL-MCNC: 104 MG/DL (ref 70–99)
HDLC SERPL-MCNC: 41 MG/DL
LDLC SERPL CALC-MCNC: 133 MG/DL
NONHDLC SERPL-MCNC: 167 MG/DL
POTASSIUM SERPL-SCNC: 3.7 MMOL/L (ref 3.4–5.3)
SODIUM SERPL-SCNC: 137 MMOL/L (ref 133–144)
TRIGL SERPL-MCNC: 168 MG/DL

## 2019-05-09 PROCEDURE — 36415 COLL VENOUS BLD VENIPUNCTURE: CPT | Performed by: FAMILY MEDICINE

## 2019-05-09 PROCEDURE — 99214 OFFICE O/P EST MOD 30 MIN: CPT | Performed by: FAMILY MEDICINE

## 2019-05-09 PROCEDURE — 80061 LIPID PANEL: CPT | Performed by: FAMILY MEDICINE

## 2019-05-09 PROCEDURE — 80048 BASIC METABOLIC PNL TOTAL CA: CPT | Performed by: FAMILY MEDICINE

## 2019-05-09 RX ORDER — HYDROCHLOROTHIAZIDE 25 MG/1
25 TABLET ORAL DAILY
Qty: 90 TABLET | Refills: 3 | Status: SHIPPED | OUTPATIENT
Start: 2019-05-09 | End: 2020-04-23

## 2019-05-09 RX ORDER — LISINOPRIL 40 MG/1
TABLET ORAL
Qty: 90 TABLET | Refills: 3 | Status: SHIPPED | OUTPATIENT
Start: 2019-05-09 | End: 2020-04-23

## 2019-05-09 ASSESSMENT — MIFFLIN-ST. JEOR: SCORE: 2082

## 2019-05-09 NOTE — PROGRESS NOTES
"    SUBJECTIVE:   Sergio Cadena is a 49 year old male who presents to clinic today for the following   health issues:      Hypertension Follow-up      Outpatient blood pressures are not being checked.    Low Salt Diet: low salt      Amount of exercise or physical activity: most days     Problems taking medications regularly: No    Medication side effects: none    Diet: regular (no restrictions)     Patient said his down fall is mountain dew  - drinks 4 cans/day     Family history: father with coronary artery disease -  in 50s    Is fasting today - not on cholesterol meds     Exercising regularly  Sleeping ok   Non smoker     Review of systems:  No cp/sob/cough  No n/v       Additional history: as documented      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     Intradural tumor     Current Outpatient Medications   Medication     fish oil-omega-3 fatty acids 1000 MG capsule     hydrochlorothiazide (HYDRODIURIL) 25 MG tablet     lisinopril (PRINIVIL/ZESTRIL) 40 MG tablet     omeprazole (PRILOSEC) 20 MG CR capsule     order for DME     No current facility-administered medications for this visit.       No Known Allergies    /88 (BP Location: Right arm, Patient Position: Sitting, Cuff Size: Adult Large)   Pulse 72   Temp 98.1  F (36.7  C) (Tympanic)   Resp 14   Ht 1.848 m (6' 0.75\")   Wt 117.2 kg (258 lb 6.4 oz)   BMI 34.33 kg/m       BP Readings from Last 6 Encounters:   19 136/88   18 (!) 152/103   18 (!) 157/98   17 134/78   17 161/84   17 (!) 140/92     GENERAL - Pt is alert and oriented in no acute distress.  Affect is appropriate. Good eye contact.  HEET - Head is normocephalic, atraumatic.    PERRLA,EEMI. Conjunctiva are free of icterus or erythema.    TMs bilaterally normal.   Oropharynx free of masses and lesions, no " tonsillar exudate or petechiae.    NECK - Neck is supple w/o LA or thyromegaly  RESPIRATORY - Clear to auscultation bilaterally.  No wheezing noted  CV - RRR, no murmurs, rubs, gallops.   EXTREM - No edema.      Assessment/Plan -     (I10) Essential hypertension  (primary encounter diagnosis)  Comment: blood pressure running upper limits of normal. He has a home cuff and will check at home for a few weeks. If high, will add third med to regimen. Discussed cards risk factors with him. The patient indicates understanding of these issues and agrees with the plan.   Plan: lisinopril (PRINIVIL/ZESTRIL) 40 MG tablet,         Basic metabolic panel, hydrochlorothiazide (HYDRODIURIL) 25 MG tablet            (K21.9) Gastroesophageal reflux disease, esophagitis presence not specified  Comment: stable.   Plan: omeprazole (PRILOSEC) 20 MG DR capsule            (E78.5) Hyperlipidemia LDL goal <130  Comment: check lipids today.   Plan: Lipid panel reflex to direct LDL Fasting            (Z12.11) Special screening for malignant neoplasms, colon  Comment: discussed screening. He will schedule colonoscopy   Plan: GASTROENTEROLOGY ADULT REF PROCEDURE ONLY [facility] [phone]; [facility]         Surgeon            SHWETHA Shanks MD   .[place]

## 2019-06-18 NOTE — PLAN OF CARE
Addended by: LORI FRANK on: 6/18/2019 11:09 AM     Modules accepted: Orders     Problem: Patient Care Overview  Goal: Plan of Care/Patient Progress Review  Outcome: Improving  POD #2 for L2-L3 laminectomy and tumor resection. Back incision open to air, no drainage, sutures approximated. All neuros intact. VSS. Was able to void HS, bladder scanned for 54ml. Up with 1/walker/GB. Regular diet. PIV SL. Pain managed with oxy q3h 5mg-10mg, patient has been receiving 5mg. Continue to monitor and POC.

## 2019-12-16 ENCOUNTER — ANESTHESIA EVENT (OUTPATIENT)
Dept: GASTROENTEROLOGY | Facility: CLINIC | Age: 49
End: 2019-12-16
Payer: COMMERCIAL

## 2019-12-16 NOTE — ANESTHESIA PREPROCEDURE EVALUATION
Anesthesia Pre-Procedure Evaluation    Patient: Sergio Cadena   MRN: [id number] : 1970          Preoperative Diagnosis: screening    Procedure(s):  COLONOSCOPY    Past Medical History:   Diagnosis Date     Esophageal reflux     EGD      Hypertension      Nerve sheath tumor vs meningioma 2017     Stricture and stenosis of esophagus     Dilated      Past Surgical History:   Procedure Laterality Date     Fracture left elbow repaired by casting       Gastroscopy  &  Esophageal dilatation  2003     GI SURGERY       LAMINECTOMY LUMBAR ONE LEVEL N/A 2017    Procedure: LAMINECTOMY LUMBAR ONE LEVEL;  Lumbar 2-3 Laminectomy with Removal of Tumor ;  Surgeon: Tash Cartagena MD;  Location: [facility] OR     LAMINECTOMY, EXCISE TUMOR LUMBAR, COMBINED N/A 2017    Procedure: COMBINED LAMINECTOMY, EXCISE TUMOR LUMBAR;;  Surgeon: Tash Cartagena MD;  Location: [facility] OR     ORTHOPEDIC SURGERY         Anesthesia Evaluation     . Pt has had prior anesthetic. Type: General           ROS/MED HX    ENT/Pulmonary:       Neurologic:     (+)other neuro S/P Intradural tumor removal    Cardiovascular:     (+) Dyslipidemia, hypertension----. : . . . :. . Previous cardiac testing date:results:date: results:ECG reviewed date:17 results:Sinus rhythm  Minimal voltage criteria for LVH, may be normal variant  Borderline ECG  No previous ECGs available date: results:          METS/Exercise Tolerance:     Hematologic:         Musculoskeletal:   (+)  other musculoskeletal- S/P lumbar laminectomy      GI/Hepatic:     (+) GERD       Renal/Genitourinary:         Endo:     (+) Obesity, .      Psychiatric:         Infectious Disease:         Malignancy:         Other:                          Physical Exam  Normal systems: cardiovascular, pulmonary and dental    Airway   Mallampati: II  TM distance: >3 FB  Neck ROM: full    Dental     Cardiovascular       Pulmonary             Lab Results   Component Value  "Date    WBC 8.0 12/11/2017    HGB 16.3 12/11/2017    HCT 46.8 12/11/2017     12/11/2017     05/09/2019    POTASSIUM 3.7 05/09/2019    CHLORIDE 104 05/09/2019    CO2 29 05/09/2019    BUN 14 05/09/2019    CR 0.86 05/09/2019     (H) 05/09/2019    KAROL 9.4 05/09/2019    MAG 2.3 12/20/2017    INR 1.04 12/11/2017    TSH 1.33 11/05/2013       Preop Vitals  BP Readings from Last 3 Encounters:   05/09/19 136/88   04/19/18 (!) 152/103   01/03/18 (!) 157/98    Pulse Readings from Last 3 Encounters:   05/09/19 72   04/19/18 61   01/03/18 114      Resp Readings from Last 3 Encounters:   05/09/19 14   04/19/18 24   12/21/17 16    SpO2 Readings from Last 3 Encounters:   04/19/18 94%   12/21/17 92%   12/11/17 95%      Temp Readings from Last 1 Encounters:   05/09/19 36.7  C (98.1  F) (Tympanic)    Ht Readings from Last 1 Encounters:   05/09/19 1.848 m (6' 0.75\")      Wt Readings from Last 1 Encounters:   05/09/19 117.2 kg (258 lb 6.4 oz)    Estimated body mass index is 34.33 kg/m  as calculated from the following:    Height as of 5/9/19: 1.848 m (6' 0.75\").    Weight as of 5/9/19: 117.2 kg (258 lb 6.4 oz).       Anesthesia Plan      History & Physical Review  History and physical reviewed and following examination; no interval change.    ASA Status:  3 .    NPO Status:  > 6 hours    Plan for General Maintenance will be TIVA.           Postoperative Care      Consents  Anesthetic plan, risks, benefits and alternatives discussed with:  Patient..                 KAILEY De Santiago CRNA  "

## 2019-12-18 ENCOUNTER — ANESTHESIA (OUTPATIENT)
Dept: GASTROENTEROLOGY | Facility: CLINIC | Age: 49
End: 2019-12-18
Payer: COMMERCIAL

## 2019-12-18 ENCOUNTER — HOSPITAL ENCOUNTER (OUTPATIENT)
Facility: CLINIC | Age: 49
Discharge: HOME | End: 2019-12-18
Attending: SURGERY | Admitting: SURGERY
Payer: COMMERCIAL

## 2019-12-18 VITALS
OXYGEN SATURATION: 98 % | HEIGHT: 74 IN | SYSTOLIC BLOOD PRESSURE: 131 MMHG | WEIGHT: 254 LBS | HEART RATE: 70 BPM | DIASTOLIC BLOOD PRESSURE: 87 MMHG | RESPIRATION RATE: 16 BRPM | BODY MASS INDEX: 32.6 KG/M2 | TEMPERATURE: 98.3 F

## 2019-12-18 LAB — COLONOSCOPY: NORMAL

## 2019-12-18 PROCEDURE — 25000125 ZZHC RX 250: Performed by: SURGERY

## 2019-12-18 PROCEDURE — 45385 COLONOSCOPY W/LESION REMOVAL: CPT | Mod: PT | Performed by: SURGERY

## 2019-12-18 PROCEDURE — 25800030 ZZH RX IP 258 OP 636: Performed by: SURGERY

## 2019-12-18 PROCEDURE — 45380 COLONOSCOPY AND BIOPSY: CPT | Performed by: SURGERY

## 2019-12-18 PROCEDURE — 25000125 ZZHC RX 250: Performed by: NURSE ANESTHETIST, CERTIFIED REGISTERED

## 2019-12-18 PROCEDURE — 37000009 ZZH ANESTHESIA TECHNICAL FEE, EACH ADDTL 15 MIN: Performed by: SURGERY

## 2019-12-18 PROCEDURE — 88305 TISSUE EXAM BY PATHOLOGIST: CPT | Performed by: SURGERY

## 2019-12-18 PROCEDURE — 37000008 ZZH ANESTHESIA TECHNICAL FEE, 1ST 30 MIN: Performed by: SURGERY

## 2019-12-18 PROCEDURE — 45385 COLONOSCOPY W/LESION REMOVAL: CPT | Performed by: SURGERY

## 2019-12-18 PROCEDURE — 88305 TISSUE EXAM BY PATHOLOGIST: CPT | Mod: 26 | Performed by: SURGERY

## 2019-12-18 PROCEDURE — 25000128 H RX IP 250 OP 636: Performed by: NURSE ANESTHETIST, CERTIFIED REGISTERED

## 2019-12-18 PROCEDURE — 45380 COLONOSCOPY AND BIOPSY: CPT | Mod: 59 | Performed by: SURGERY

## 2019-12-18 RX ORDER — PROPOFOL 10 MG/ML
INJECTION, EMULSION INTRAVENOUS CONTINUOUS PRN
Status: DISCONTINUED | OUTPATIENT
Start: 2019-12-18 | End: 2019-12-18

## 2019-12-18 RX ORDER — SODIUM CHLORIDE, SODIUM LACTATE, POTASSIUM CHLORIDE, CALCIUM CHLORIDE 600; 310; 30; 20 MG/100ML; MG/100ML; MG/100ML; MG/100ML
INJECTION, SOLUTION INTRAVENOUS CONTINUOUS
Status: DISCONTINUED | OUTPATIENT
Start: 2019-12-18 | End: 2019-12-18 | Stop reason: HOSPADM

## 2019-12-18 RX ORDER — ONDANSETRON 2 MG/ML
4 INJECTION INTRAMUSCULAR; INTRAVENOUS
Status: DISCONTINUED | OUTPATIENT
Start: 2019-12-18 | End: 2019-12-18 | Stop reason: HOSPADM

## 2019-12-18 RX ORDER — NALOXONE HYDROCHLORIDE 0.4 MG/ML
.1-.4 INJECTION, SOLUTION INTRAMUSCULAR; INTRAVENOUS; SUBCUTANEOUS
Status: DISCONTINUED | OUTPATIENT
Start: 2019-12-18 | End: 2019-12-18 | Stop reason: HOSPADM

## 2019-12-18 RX ORDER — LIDOCAINE 40 MG/G
CREAM TOPICAL
Status: DISCONTINUED | OUTPATIENT
Start: 2019-12-18 | End: 2019-12-18 | Stop reason: HOSPADM

## 2019-12-18 RX ORDER — PROPOFOL 10 MG/ML
INJECTION, EMULSION INTRAVENOUS PRN
Status: DISCONTINUED | OUTPATIENT
Start: 2019-12-18 | End: 2019-12-18

## 2019-12-18 RX ORDER — FLUMAZENIL 0.1 MG/ML
0.2 INJECTION, SOLUTION INTRAVENOUS
Status: DISCONTINUED | OUTPATIENT
Start: 2019-12-18 | End: 2019-12-18 | Stop reason: HOSPADM

## 2019-12-18 RX ADMIN — SODIUM CHLORIDE, POTASSIUM CHLORIDE, SODIUM LACTATE AND CALCIUM CHLORIDE: 600; 310; 30; 20 INJECTION, SOLUTION INTRAVENOUS at 07:32

## 2019-12-18 RX ADMIN — PROPOFOL 50 MG: 10 INJECTION, EMULSION INTRAVENOUS at 07:58

## 2019-12-18 RX ADMIN — PROPOFOL 50 MG: 10 INJECTION, EMULSION INTRAVENOUS at 07:59

## 2019-12-18 RX ADMIN — SODIUM CHLORIDE, POTASSIUM CHLORIDE, SODIUM LACTATE AND CALCIUM CHLORIDE: 600; 310; 30; 20 INJECTION, SOLUTION INTRAVENOUS at 07:56

## 2019-12-18 RX ADMIN — LIDOCAINE HYDROCHLORIDE 0.1 ML: 10 INJECTION, SOLUTION EPIDURAL; INFILTRATION; INTRACAUDAL; PERINEURAL at 07:32

## 2019-12-18 RX ADMIN — PROPOFOL 200 MCG/KG/MIN: 10 INJECTION, EMULSION INTRAVENOUS at 07:59

## 2019-12-18 ASSESSMENT — MIFFLIN-ST. JEOR: SCORE: 2081.89

## 2019-12-18 NOTE — ANESTHESIA CARE TRANSFER NOTE
Patient: Sergio Cadena    Procedure(s):  COLONOSCOPY, FLEXIBLE, WITH LESION REMOVAL USING SNARE  Colonoscopy, With Polypectomy And Biopsy & Clip Application    Diagnosis: screening  Diagnosis Additional Information: No value filed.    Anesthesia Type:   General     Note:  Airway :Nasal Cannula  Patient transferred to:Phase II  Handoff Report: Identifed the Patient, Identified the Reponsible Provider, Reviewed the pertinent medical history, Discussed the surgical course, Reviewed Intra-OP anesthesia mangement and issues during anesthesia, Set expectations for post-procedure period and Allowed opportunity for questions and acknowledgement of understanding      Vitals: (Last set prior to Anesthesia Care Transfer)    CRNA VITALS  12/18/2019 0758 - 12/18/2019 0833      12/18/2019             Pulse:  76    SpO2:  96 %                Electronically Signed By: Raymundo Maxwell CRNA, APRN CRNA  December 18, 2019  8:33 AM

## 2019-12-18 NOTE — H&P
"49 year old year old male here for colonoscopy for screening.  This is patient's first colonoscopy.  There is no family history of CRC or polyps.  Patient denies blood in stool or change in stool caliber.    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 5/16/2017     Stricture and stenosis of esophagus     Dilated 9/05       Past Surgical History:   Procedure Laterality Date     Fracture left elbow repaired by casting  1980     Gastroscopy  &  Esophageal dilatation  7/9/2003     GI SURGERY       LAMINECTOMY LUMBAR ONE LEVEL N/A 12/18/2017    Procedure: LAMINECTOMY LUMBAR ONE LEVEL;  Lumbar 2-3 Laminectomy with Removal of Tumor ;  Surgeon: Tash Cartagena MD;  Location: [facility] OR     LAMINECTOMY, EXCISE TUMOR LUMBAR, COMBINED N/A 12/18/2017    Procedure: COMBINED LAMINECTOMY, EXCISE TUMOR LUMBAR;;  Surgeon: Tash Cartagena MD;  Location: [facility] OR     ORTHOPEDIC SURGERY         Family History   Problem Relation Age of Onset     Heart Disease Paternal Grandmother        No current outpatient medications on file.       No Known Allergies    Pt reports that he has never smoked. He has never used smokeless tobacco. He reports current alcohol use of about 2.0 - 3.0 standard drinks of alcohol per week. He reports that he does not use drugs.    Exam:  BP (!) 149/103   Temp 98.3  F (36.8  C) (Oral)   Resp 16   Ht 1.88 m (6' 2\")   Wt 115.2 kg (254 lb)   SpO2 96%   BMI 32.61 kg/m      Awake, Alert OX3  Lungs - CTA bilaterally  CV - RRR, no murmurs, distal pulses intact  Abd - soft, non-distended, non-tender, +BS  Extr - No cyanosis or edema    A/P 49 year old year " old male in need of colonoscopy for screening. Risks, benefits, alternatives, and complications were discussed including the possibility of perforation, bleeding, missed lesion and the patient agreed to proceed    Doug Marino DO on 12/18/2019 at 7:28 AM

## 2019-12-18 NOTE — ANESTHESIA POSTPROCEDURE EVALUATION
Patient: Sergio Cadena    Procedure(s):  COLONOSCOPY, FLEXIBLE, WITH LESION REMOVAL USING SNARE  Colonoscopy, With Polypectomy And Biopsy & Clip Application    Diagnosis:screening  Diagnosis Additional Information: No value filed.    Anesthesia Type:  General    Note:  Anesthesia Post Evaluation    Patient location during evaluation: Bedside  Patient participation: Able to fully participate in evaluation  Level of consciousness: awake and alert  Pain management: adequate  Airway patency: patent  Cardiovascular status: acceptable  Respiratory status: acceptable  Hydration status: acceptable  PONV: none     Anesthetic complications: None          Last vitals:  Vitals:    12/18/19 0659 12/18/19 0830   BP: (!) 149/103 122/88   Pulse:  75   Resp: 16 16   Temp: 36.8  C (98.3  F)    SpO2: 96% 96%         Electronically Signed By: Raymundo Maxwell CRNA, APRN CRNA  December 18, 2019  8:33 AM

## 2019-12-23 LAB — COPATH REPORT: NORMAL

## 2019-12-31 PROBLEM — D12.6 TUBULAR ADENOMA OF COLON: Status: ACTIVE | Noted: 2019-12-31

## 2020-04-23 DIAGNOSIS — K21.9 GASTROESOPHAGEAL REFLUX DISEASE, ESOPHAGITIS PRESENCE NOT SPECIFIED: ICD-10-CM

## 2020-04-23 DIAGNOSIS — I10 ESSENTIAL HYPERTENSION: ICD-10-CM

## 2020-04-23 RX ORDER — LISINOPRIL 40 MG/1
TABLET ORAL
Qty: 90 TABLET | Refills: 0 | Status: SHIPPED | OUTPATIENT
Start: 2020-04-23 | End: 2020-09-29

## 2020-04-23 RX ORDER — HYDROCHLOROTHIAZIDE 25 MG/1
TABLET ORAL
Qty: 90 TABLET | Refills: 0 | Status: SHIPPED | OUTPATIENT
Start: 2020-04-23 | End: 2020-09-29

## 2020-04-23 NOTE — TELEPHONE ENCOUNTER
Prescription approved per [facility] Refill Protocol.  Swati Briggs RN

## 2020-09-24 ENCOUNTER — TELEPHONE (OUTPATIENT)
Dept: FAMILY MEDICINE | Facility: CLINIC | Age: 50
End: 2020-09-24

## 2020-09-24 DIAGNOSIS — K21.9 GASTROESOPHAGEAL REFLUX DISEASE, ESOPHAGITIS PRESENCE NOT SPECIFIED: ICD-10-CM

## 2020-09-24 DIAGNOSIS — I10 ESSENTIAL HYPERTENSION: ICD-10-CM

## 2020-09-24 NOTE — LETTER
[facility]  [address]  [facility] [address]  Phone: [phone]        October 30, 2020      Sergio Cadena                                                                                                      [address] [address] [address]            Dear Mr. Cadena,    We are concerned about your health care.  Many medications require routine follow-up with your Doctor.      At this time we ask that: You schedule a routine office visit with your physician to follow up on your medications. If you are being followed by another provider at another clinic please contact us so we can update your chart.    Your prescriptions:  Cannot be refilled until the above noted follow up has been completed.  Please call [phone] to schedule your appointment with your provider.        Thank you,  Dr. Leda Jenkins (formerly Dr. Shanks), MD / jurgen

## 2020-09-25 NOTE — TELEPHONE ENCOUNTER
Routing refill request to provider for review/approval because:  Labs not current:    Patient needs to be seen because it has been more than 1 year since last office visit.  Swati Briggs RN

## 2020-09-25 NOTE — TELEPHONE ENCOUNTER
Please call patient to schedule annual in office visit / med check.  It looks like on the prescription it says that this is a refill in advance so he shouldn't have run out yet, once he has an appointment okay to refill  Anabell Colón PA-C

## 2020-09-28 RX ORDER — LISINOPRIL 40 MG/1
TABLET ORAL
Qty: 90 TABLET | Refills: 0 | OUTPATIENT
Start: 2020-09-28

## 2020-09-28 RX ORDER — HYDROCHLOROTHIAZIDE 25 MG/1
TABLET ORAL
Qty: 90 TABLET | Refills: 0 | OUTPATIENT
Start: 2020-09-28

## 2020-09-29 NOTE — TELEPHONE ENCOUNTER
Left a message for patient to return our call. CSS ok to give message. Thank you!    Robyn Salgado RN

## 2020-12-07 DIAGNOSIS — E78.5 HYPERLIPIDEMIA LDL GOAL <130: Primary | ICD-10-CM

## 2020-12-07 DIAGNOSIS — I10 ESSENTIAL HYPERTENSION: ICD-10-CM

## 2020-12-08 RX ORDER — LISINOPRIL 40 MG/1
40 TABLET ORAL DAILY
Qty: 30 TABLET | Refills: 0 | OUTPATIENT
Start: 2020-12-08

## 2020-12-08 RX ORDER — HYDROCHLOROTHIAZIDE 25 MG/1
25 TABLET ORAL DAILY
Qty: 30 TABLET | Refills: 0 | OUTPATIENT
Start: 2020-12-08

## 2020-12-08 NOTE — TELEPHONE ENCOUNTER
Pharmacy escribed that he needs appointment. See 9/24/20 encounter where patient was notified.   Jayden Butler RN

## 2020-12-29 DIAGNOSIS — I10 ESSENTIAL HYPERTENSION: ICD-10-CM

## 2020-12-30 RX ORDER — LISINOPRIL 40 MG/1
40 TABLET ORAL DAILY
Qty: 14 TABLET | Refills: 0 | Status: SHIPPED | OUTPATIENT
Start: 2020-12-30 | End: 2021-02-04

## 2020-12-30 RX ORDER — HYDROCHLOROTHIAZIDE 25 MG/1
25 TABLET ORAL DAILY
Qty: 14 TABLET | Refills: 0 | Status: SHIPPED | OUTPATIENT
Start: 2020-12-30 | End: 2021-02-04

## 2020-12-30 NOTE — TELEPHONE ENCOUNTER
He has been told several times he needs a visit.   1.5 years overdue for labs  I filled his bp medications for 14 days only which should give him time to get in to clinic for visit  I did NOT refill his omeprazole as this is not an urgent medication    Eileen

## 2020-12-30 NOTE — TELEPHONE ENCOUNTER
Routing refill request to provider for review/approval because:  Patient needs to be seen because it has been more than 1 year since last office visit. 5/9/19  Mildred was given x1.  Med was denied on 12/8/20.   See 9/24/20 encounter where he was communicated that he needs appointment.  Jayden Butler RN

## 2020-12-31 NOTE — TELEPHONE ENCOUNTER
I have attempted to contact this patient TWICE by phone with the following results: busy signal.    Imelda WOODALL RN, BSN

## 2021-01-11 DIAGNOSIS — K21.9 GASTROESOPHAGEAL REFLUX DISEASE, UNSPECIFIED WHETHER ESOPHAGITIS PRESENT: Primary | ICD-10-CM

## 2021-01-11 NOTE — TELEPHONE ENCOUNTER
Routing refill request to provider for review/approval because:  Patient needs to be seen because it has been more than 1 year since last office visit. 5/9/19  Jayden Butler RN

## 2021-01-22 DIAGNOSIS — K21.9 GASTROESOPHAGEAL REFLUX DISEASE, UNSPECIFIED WHETHER ESOPHAGITIS PRESENT: ICD-10-CM

## 2021-01-30 DIAGNOSIS — I10 ESSENTIAL HYPERTENSION: ICD-10-CM

## 2021-02-01 NOTE — TELEPHONE ENCOUNTER
"Requested Prescriptions   Pending Prescriptions Disp Refills     hydrochlorothiazide (HYDRODIURIL) 25 MG tablet [Pharmacy Med Name: HYDROCHLOROTHIAZIDE 25MG TAB] 14 tablet 0     Sig: TAKE 1 TABLET BY MOUTH DAILY       Diuretics (Including Combos) Protocol Failed - 1/30/2021  1:19 AM        Failed - Blood pressure under 140/90 in past 12 months     BP Readings from Last 3 Encounters:   12/18/19 131/87   05/09/19 136/88   04/19/18 (!) 152/103           Failed - Recent (12 mo) or future (30 days) visit within the authorizing provider's specialty     Patient has had an office visit with the authorizing provider or a provider within the authorizing providers department within the previous 12 mos or has a future within next 30 days. See \"Patient Info\" tab in inbasket, or \"Choose Columns\" in Meds & Orders section of the refill encounter.            Failed - Normal serum creatinine on file in past 12 months     Recent Labs   Lab Test 05/09/19  0954   CR 0.86            Failed - Normal serum potassium on file in past 12 months     Recent Labs   Lab Test 05/09/19  0954   POTASSIUM 3.7            Failed - Normal serum sodium on file in past 12 months     Recent Labs   Lab Test 05/09/19  0954               Passed - Medication is active on med list        Passed - Patient is age 18 or older           lisinopril (ZESTRIL) 40 MG tablet [Pharmacy Med Name: LISINOPRIL 40MG TABLET] 14 tablet 0     Sig: TAKE 1 TABLET BY MOUTH DAILY       ACE Inhibitors (Including Combos) Protocol Failed - 1/30/2021  1:19 AM        Failed - Blood pressure under 140/90 in past 12 months     BP Readings from Last 3 Encounters:   12/18/19 131/87   05/09/19 136/88   04/19/18 (!) 152/103           Failed - Recent (12 mo) or future (30 days) visit within the authorizing provider's specialty     Patient has had an office visit with the authorizing provider or a provider within the authorizing providers department within the previous 12 mos or has a " "future within next 30 days. See \"Patient Info\" tab in inbasket, or \"Choose Columns\" in Meds & Orders section of the refill encounter.            Failed - Normal serum creatinine on file in past 12 months     Recent Labs   Lab Test 05/09/19  0954   CR 0.86     Ok to refill medication if creatinine is low          Failed - Normal serum potassium on file in past 12 months             Passed - Medication is active on med list        Passed - Patient is age 18 or older             "

## 2021-02-01 NOTE — TELEPHONE ENCOUNTER
routing refill request to provider for review/approval because:  Mildred given at LEAST x1 and patient did not follow up, please advise  Patient needs to be seen because it has been more than 1 year since last office visit. LAST OV 5/9/19    Imelda WOODALL RN, BSN

## 2021-02-04 RX ORDER — LISINOPRIL 40 MG/1
TABLET ORAL
Qty: 14 TABLET | Refills: 0 | Status: SHIPPED | OUTPATIENT
Start: 2021-02-04

## 2021-02-04 RX ORDER — HYDROCHLOROTHIAZIDE 25 MG/1
TABLET ORAL
Qty: 14 TABLET | Refills: 0 | Status: SHIPPED | OUTPATIENT
Start: 2021-02-04

## 2021-02-04 NOTE — TELEPHONE ENCOUNTER
Please call him. It's been nearly two years so he needs an appointment. He should come fasting.     SHWETHA Jenkins MD

## 2021-02-05 NOTE — TELEPHONE ENCOUNTER
Patient states due to his insurance he has to be seen at [facility] now. Advised Dr Jenkins gave him 14 days worth of medication and he will need to be seen at [facility] before that runs out. He expressed understanding.    Robyn Salgado RN

## 2021-02-19 DIAGNOSIS — K21.9 GASTROESOPHAGEAL REFLUX DISEASE, UNSPECIFIED WHETHER ESOPHAGITIS PRESENT: ICD-10-CM

## 2021-02-19 DIAGNOSIS — I10 ESSENTIAL HYPERTENSION: ICD-10-CM

## 2021-02-22 RX ORDER — LISINOPRIL 40 MG/1
TABLET ORAL
Qty: 14 TABLET | Refills: 0 | OUTPATIENT
Start: 2021-02-22

## 2021-02-22 RX ORDER — HYDROCHLOROTHIAZIDE 25 MG/1
TABLET ORAL
Qty: 14 TABLET | Refills: 0 | OUTPATIENT
Start: 2021-02-22

## 2021-02-22 NOTE — TELEPHONE ENCOUNTER
"Requested Prescriptions   Pending Prescriptions Disp Refills     hydrochlorothiazide (HYDRODIURIL) 25 MG tablet [Pharmacy Med Name: HYDROCHLOROTHIAZIDE 25MG TAB] 14 tablet 0     Sig: TAKE 1 TABLET BY MOUTH DAILY       Diuretics (Including Combos) Protocol Failed - 2/19/2021  4:02 PM        Failed - Blood pressure under 140/90 in past 12 months     BP Readings from Last 3 Encounters:   12/18/19 131/87   05/09/19 136/88   04/19/18 (!) 152/103                 Failed - Recent (12 mo) or future (30 days) visit within the authorizing provider's specialty     Patient has had an office visit with the authorizing provider or a provider within the authorizing providers department within the previous 12 mos or has a future within next 30 days. See \"Patient Info\" tab in inbasket, or \"Choose Columns\" in Meds & Orders section of the refill encounter.              Failed - Normal serum creatinine on file in past 12 months     Recent Labs   Lab Test 05/09/19  0954   CR 0.86              Failed - Normal serum potassium on file in past 12 months     Recent Labs   Lab Test 05/09/19  0954   POTASSIUM 3.7                    Failed - Normal serum sodium on file in past 12 months     Recent Labs   Lab Test 05/09/19  0954                 Passed - Medication is active on med list        Passed - Patient is age 18 or older           lisinopril (ZESTRIL) 40 MG tablet [Pharmacy Med Name: LISINOPRIL 40MG TABLET] 14 tablet 0     Sig: TAKE 1 TABLET BY MOUTH DAILY       ACE Inhibitors (Including Combos) Protocol Failed - 2/19/2021  4:02 PM        Failed - Blood pressure under 140/90 in past 12 months     BP Readings from Last 3 Encounters:   12/18/19 131/87   05/09/19 136/88   04/19/18 (!) 152/103                 Failed - Recent (12 mo) or future (30 days) visit within the authorizing provider's specialty     Patient has had an office visit with the authorizing provider or a provider within the authorizing providers department within the " "previous 12 mos or has a future within next 30 days. See \"Patient Info\" tab in inbasket, or \"Choose Columns\" in Meds & Orders section of the refill encounter.              Failed - Normal serum creatinine on file in past 12 months     Recent Labs   Lab Test 05/09/19  0954   CR 0.86       Ok to refill medication if creatinine is low          Failed - Normal serum potassium on file in past 12 months     Recent Labs   Lab Test 05/09/19  0954   POTASSIUM 3.7             Passed - Medication is active on med list        Passed - Patient is age 18 or older           omeprazole (PRILOSEC) 20 MG DR capsule 30 capsule 0     Sig: Take 1 capsule (20 mg) by mouth daily       PPI Protocol Failed - 2/19/2021  4:02 PM        Failed - Recent (12 mo) or future (30 days) visit within the authorizing provider's specialty     Patient has had an office visit with the authorizing provider or a provider within the authorizing providers department within the previous 12 mos or has a future within next 30 days. See \"Patient Info\" tab in inAppingtonsket, or \"Choose Columns\" in Meds & Orders section of the refill encounter.              Passed - Not on Clopidogrel (unless Pantoprazole ordered)        Passed - No diagnosis of osteoporosis on record        Passed - Medication is active on med list        Passed - Patient is age 18 or older             "

## 2021-02-22 NOTE — TELEPHONE ENCOUNTER
See phone note 1/30/2021  No further refills from us  He was going to establish care at [facility]     SHWETHA Jenkins MD

## 2021-03-08 DIAGNOSIS — I10 ESSENTIAL HYPERTENSION: ICD-10-CM

## 2021-03-08 RX ORDER — HYDROCHLOROTHIAZIDE 25 MG/1
25 TABLET ORAL DAILY
Qty: 14 TABLET | Refills: 0 | Status: CANCELLED | OUTPATIENT
Start: 2021-03-08

## 2021-03-08 RX ORDER — LISINOPRIL 40 MG/1
40 TABLET ORAL DAILY
Qty: 14 TABLET | Refills: 0 | Status: CANCELLED | OUTPATIENT
Start: 2021-03-08

## 2021-03-17 DIAGNOSIS — K21.9 GASTROESOPHAGEAL REFLUX DISEASE, UNSPECIFIED WHETHER ESOPHAGITIS PRESENT: ICD-10-CM

## 2021-03-17 DIAGNOSIS — I10 ESSENTIAL HYPERTENSION: ICD-10-CM

## 2021-03-18 NOTE — TELEPHONE ENCOUNTER
Routing refill request to provider for review/approval because:  Labs not current:  Last labs done on 5/2019 --- future CMP and Lipid in place  Patient needs to be seen because it has been more than 1 year since last office visit.  BP last documented 12/2019      Arnaud Novak RN

## 2021-03-19 RX ORDER — LISINOPRIL 40 MG/1
40 TABLET ORAL DAILY
Qty: 14 TABLET | Refills: 0 | OUTPATIENT
Start: 2021-03-19

## 2021-03-19 RX ORDER — HYDROCHLOROTHIAZIDE 25 MG/1
25 TABLET ORAL DAILY
Qty: 14 TABLET | Refills: 0 | OUTPATIENT
Start: 2021-03-19

## 2021-03-19 NOTE — TELEPHONE ENCOUNTER
Multiple melony refills have been done  Multiple phone reminders to schedule  No further refills at this time    Please let him know  Needs OV and fasting labs    SHWETHA Jenkins MD

## 2021-04-30 DIAGNOSIS — I10 ESSENTIAL HYPERTENSION: ICD-10-CM

## 2021-05-02 RX ORDER — LISINOPRIL 40 MG/1
40 TABLET ORAL DAILY
Qty: 14 TABLET | Refills: 0 | OUTPATIENT
Start: 2021-05-02

## 2021-05-07 DIAGNOSIS — K21.9 GASTROESOPHAGEAL REFLUX DISEASE, UNSPECIFIED WHETHER ESOPHAGITIS PRESENT: ICD-10-CM

## 2021-06-04 DIAGNOSIS — I10 ESSENTIAL HYPERTENSION: ICD-10-CM

## 2021-06-04 DIAGNOSIS — K21.9 GASTROESOPHAGEAL REFLUX DISEASE, UNSPECIFIED WHETHER ESOPHAGITIS PRESENT: ICD-10-CM

## 2021-06-04 RX ORDER — HYDROCHLOROTHIAZIDE 25 MG/1
25 TABLET ORAL DAILY
Qty: 14 TABLET | Refills: 0 | OUTPATIENT
Start: 2021-06-04

## 2021-06-04 NOTE — TELEPHONE ENCOUNTER
HCTZ 25mg    Last Written Prescription Date:  02/13/2021 #14 x 0  Last filled 02/04/2021 #14 x 0     Omeprazole DR 20mg   Last Written Prescription Date: 02/04/2021  #30 x 0  Last Filled 01/26/2021 #30 x0        Last office visit: Visit date not found with prescribing provider:  05/09/2019 Shanks  [facility] Office Visit:  Non

## 2021-06-04 NOTE — TELEPHONE ENCOUNTER
Routing refill request to provider for review/approval because:  Patient needs to be seen because it has been more than 1 year since last office visit.    Arnaud Novak RN

## 2021-06-04 NOTE — TELEPHONE ENCOUNTER
Multiple melony refills have been given.  No further prescriptions will be filled   SHWETHA Jenkins MD

## 2022-02-17 PROBLEM — K21.9 GASTROESOPHAGEAL REFLUX DISEASE: Status: ACTIVE | Noted: 2019-05-09

## 2023-12-06 NOTE — PLAN OF CARE
Problem: Patient Care Overview  Goal: Plan of Care/Patient Progress Review  Outcome: No Change  POD #3 for L2-L3 lami and tumor resection. Back incision sutures approximated with no knew drainage. VSS. Neuros intact. Small BM HS. Denies pain Voids without difficulty. NPO for abdominal distention. PIV NS @75ml/hr. Up with 1/walker SBA. Waiting for BM, then D/C home. Continue to monitor.        Received wound consult for right elbow.  Photos reviewed, small discolored area that appears as a birth rui or mild bruise.  No advanced wound care needs at this time.  Consult completed.

## (undated) DEVICE — SUCTION MANIFOLD DORNOCH ULTRA CART UL-CL500

## (undated) DEVICE — COVER NEOPROBE SOFTFLEX 5X96" W/BANDS 20-PC596

## (undated) DEVICE — DRSG TELFA 3X8" 1238

## (undated) DEVICE — IOM SUPPLY

## (undated) DEVICE — SU VICRYL 0 CT-1 CR 8X18" J740D

## (undated) DEVICE — WIPES FOLEY CARE SURESTEP PROVON DFC100

## (undated) DEVICE — SPONGE SURGIFOAM 01GM POWDER 1978

## (undated) DEVICE — PREP DURAPREP 26ML APL 8630

## (undated) DEVICE — NDL BLUNT 17GA 1.5" 8881202330

## (undated) DEVICE — SOL RINGERS LACTATED 1000ML BAG 2B2324X

## (undated) DEVICE — PACK NEURO MINOR UMMC SNE32MNMU4

## (undated) DEVICE — SU ETHILON 3-0 PS-1 18" 1663H

## (undated) DEVICE — DRSG PRIMAPORE 03 1/8X6" 66000318

## (undated) DEVICE — PREP POVIDONE IODINE SOLUTION 10% 120ML

## (undated) DEVICE — DRAPE MICROSCOPE LEICA 54X150" AR8033650

## (undated) DEVICE — SU VICRYL 2-0 CT-2 CR 8X18" J726D

## (undated) DEVICE — SPONGE SURGIFOAM 100 1974

## (undated) DEVICE — DRAPE STERI TOWEL LG 1010

## (undated) DEVICE — DRAPE MAYO STAND 23X54 8337

## (undated) DEVICE — SU NUROLON 4-0 TF CR 8X18" C584D

## (undated) DEVICE — STPL SKIN 35W 059037

## (undated) DEVICE — SOL NACL 0.9% 10ML VIAL 0409-4888-02

## (undated) DEVICE — CATH TRAY FOLEY SURESTEP 16FR W/URNE MTR STLK LATEX A303316A

## (undated) DEVICE — DECANTER BAG 2002S

## (undated) DEVICE — NDL BLUNT 18GA 1" W/O FILTER 305181

## (undated) DEVICE — WIPE PREMOIST CLEANSING WASHCLOTHS 7988

## (undated) DEVICE — SU PROLENE 6-0 BV-1DA 18" 8709H

## (undated) DEVICE — NDL ANGIOCATH 14GA 1.25" 4048

## (undated) DEVICE — SYR 10ML FINGER CONTROL W/O NDL 309695

## (undated) DEVICE — SU MONOCRYL 4-0 PS-2 27" UND Y426H

## (undated) DEVICE — SYR 30ML LL W/O NDL 302832

## (undated) DEVICE — BLADE KNIFE BEAVER MICROSHARP GREEN 377515

## (undated) DEVICE — ESU CORD BIPOLAR AND IRR TUBING AESCULAP US355

## (undated) DEVICE — SPONGE COTTONOID 1/2X1/2" 80-1400

## (undated) DEVICE — GLOVE PROTEXIS MICRO 7.0  2D73PM70

## (undated) DEVICE — DRSG GAUZE 4X4" TRAY 6939

## (undated) DEVICE — DRAPE C-ARM W/STRAPS 42X72" 07-CA104

## (undated) DEVICE — SYR 10ML LL W/O NDL 302995

## (undated) DEVICE — SOL WATER IRRIG 1000ML BOTTLE 2F7114

## (undated) DEVICE — ESU GROUND PAD ADULT W/CORD E7507

## (undated) DEVICE — BUR MATCHSTICK 3MM ANSPACH L-8NS-G1

## (undated) DEVICE — GLOVE PROTEXIS BLUE W/NEU-THERA 7.0  2D73EB70

## (undated) DEVICE — LINEN TOWEL PACK X30 5481

## (undated) DEVICE — PREP CHLORAPREP CLEAR 3ML 260400

## (undated) DEVICE — SPONGE COTTONOID 1/2X1 1/2" 1-STRING 80-1404

## (undated) RX ORDER — PROPOFOL 10 MG/ML
INJECTION, EMULSION INTRAVENOUS
Status: DISPENSED
Start: 2017-12-18

## (undated) RX ORDER — ALBUMIN, HUMAN INJ 5% 5 %
SOLUTION INTRAVENOUS
Status: DISPENSED
Start: 2017-12-18

## (undated) RX ORDER — ONDANSETRON 2 MG/ML
INJECTION INTRAMUSCULAR; INTRAVENOUS
Status: DISPENSED
Start: 2017-12-18

## (undated) RX ORDER — HYDROMORPHONE HYDROCHLORIDE 1 MG/ML
INJECTION, SOLUTION INTRAMUSCULAR; INTRAVENOUS; SUBCUTANEOUS
Status: DISPENSED
Start: 2017-12-18

## (undated) RX ORDER — DEXAMETHASONE SODIUM PHOSPHATE 4 MG/ML
INJECTION, SOLUTION INTRA-ARTICULAR; INTRALESIONAL; INTRAMUSCULAR; INTRAVENOUS; SOFT TISSUE
Status: DISPENSED
Start: 2017-12-18

## (undated) RX ORDER — FENTANYL CITRATE 50 UG/ML
INJECTION, SOLUTION INTRAMUSCULAR; INTRAVENOUS
Status: DISPENSED
Start: 2017-12-18

## (undated) RX ORDER — CEFAZOLIN SODIUM 1 G/3ML
INJECTION, POWDER, FOR SOLUTION INTRAMUSCULAR; INTRAVENOUS
Status: DISPENSED
Start: 2017-12-18

## (undated) RX ORDER — LIDOCAINE HYDROCHLORIDE 20 MG/ML
INJECTION, SOLUTION EPIDURAL; INFILTRATION; INTRACAUDAL; PERINEURAL
Status: DISPENSED
Start: 2017-12-18

## (undated) RX ORDER — BUPIVACAINE HYDROCHLORIDE AND EPINEPHRINE 5; 5 MG/ML; UG/ML
INJECTION, SOLUTION EPIDURAL; INTRACAUDAL; PERINEURAL
Status: DISPENSED
Start: 2017-12-18

## (undated) RX ORDER — SODIUM CHLORIDE 9 MG/ML
INJECTION, SOLUTION INTRAVENOUS
Status: DISPENSED
Start: 2017-12-18

## (undated) RX ORDER — CEFAZOLIN SODIUM 2 G/100ML
INJECTION, SOLUTION INTRAVENOUS
Status: DISPENSED
Start: 2017-12-18

## (undated) RX ORDER — GLYCOPYRROLATE 0.2 MG/ML
INJECTION, SOLUTION INTRAMUSCULAR; INTRAVENOUS
Status: DISPENSED
Start: 2017-12-18

## (undated) RX ORDER — PHENYLEPHRINE HCL IN 0.9% NACL 1 MG/10 ML
SYRINGE (ML) INTRAVENOUS
Status: DISPENSED
Start: 2017-12-18

## (undated) RX ORDER — BACITRACIN 50000 [IU]/1
INJECTION, POWDER, FOR SOLUTION INTRAMUSCULAR
Status: DISPENSED
Start: 2017-12-18